# Patient Record
Sex: FEMALE | Race: BLACK OR AFRICAN AMERICAN | NOT HISPANIC OR LATINO | Employment: FULL TIME | URBAN - METROPOLITAN AREA
[De-identification: names, ages, dates, MRNs, and addresses within clinical notes are randomized per-mention and may not be internally consistent; named-entity substitution may affect disease eponyms.]

---

## 2018-04-06 ENCOUNTER — OFFICE VISIT (OUTPATIENT)
Dept: URGENT CARE | Facility: CLINIC | Age: 23
End: 2018-04-06
Payer: COMMERCIAL

## 2018-04-06 VITALS
SYSTOLIC BLOOD PRESSURE: 130 MMHG | TEMPERATURE: 99 F | WEIGHT: 293 LBS | RESPIRATION RATE: 12 BRPM | OXYGEN SATURATION: 99 % | HEIGHT: 66 IN | BODY MASS INDEX: 47.09 KG/M2 | DIASTOLIC BLOOD PRESSURE: 84 MMHG | HEART RATE: 88 BPM

## 2018-04-06 DIAGNOSIS — R30.0 DYSURIA: Primary | ICD-10-CM

## 2018-04-06 DIAGNOSIS — N30.01 ACUTE CYSTITIS WITH HEMATURIA: ICD-10-CM

## 2018-04-06 LAB
B-HCG UR QL: NEGATIVE
BILIRUB UR QL STRIP: NEGATIVE
CTP QC/QA: YES
GLUCOSE UR QL STRIP: NEGATIVE
KETONES UR QL STRIP: NEGATIVE
LEUKOCYTE ESTERASE UR QL STRIP: POSITIVE
PH, POC UA: 6 (ref 5–8)
POC BLOOD, URINE: POSITIVE
POC NITRATES, URINE: NEGATIVE
PROT UR QL STRIP: POSITIVE
SP GR UR STRIP: 1.01 (ref 1–1.03)
UROBILINOGEN UR STRIP-ACNC: NORMAL (ref 0.1–1.1)

## 2018-04-06 PROCEDURE — 81025 URINE PREGNANCY TEST: CPT | Mod: S$GLB,,, | Performed by: NURSE PRACTITIONER

## 2018-04-06 PROCEDURE — 81003 URINALYSIS AUTO W/O SCOPE: CPT | Mod: QW,S$GLB,, | Performed by: NURSE PRACTITIONER

## 2018-04-06 PROCEDURE — 99203 OFFICE O/P NEW LOW 30 MIN: CPT | Mod: 25,S$GLB,, | Performed by: NURSE PRACTITIONER

## 2018-04-06 RX ORDER — NITROFURANTOIN 25; 75 MG/1; MG/1
100 CAPSULE ORAL 2 TIMES DAILY
Qty: 10 CAPSULE | Refills: 0 | Status: SHIPPED | OUTPATIENT
Start: 2018-04-06 | End: 2018-04-11

## 2018-04-06 RX ORDER — PHENAZOPYRIDINE HYDROCHLORIDE 200 MG/1
200 TABLET, FILM COATED ORAL 3 TIMES DAILY PRN
Qty: 20 TABLET | Refills: 0 | Status: SHIPPED | OUTPATIENT
Start: 2018-04-06 | End: 2019-04-06

## 2018-04-06 NOTE — PROGRESS NOTES
"Subjective:       Patient ID: Saul Huff is a 22 y.o. female.    Vitals:  height is 5' 5.5" (1.664 m) and weight is 135.2 kg (298 lb). Her tympanic temperature is 98.8 °F (37.1 °C). Her blood pressure is 130/84 and her pulse is 88. Her respiration is 12 and oxygen saturation is 99%.     Chief Complaint: Urinary Tract Infection    Pt states she has pain on urination, frequency, urgency.      Urinary Tract Infection    This is a new problem. The current episode started today. The problem occurs intermittently. The problem has been gradually worsening. The quality of the pain is described as burning and aching. The pain is at a severity of 7/10. The pain is moderate. There has been no fever. She is sexually active. There is no history of pyelonephritis. Associated symptoms include flank pain, frequency and urgency. Pertinent negatives include no behavior changes, chills, discharge, hematuria, hesitancy, nausea, possible pregnancy, sweats, vomiting, weight loss, bubble bath use, constipation, rash or withholding. Treatments tried: AZO. The treatment provided no relief. Her past medical history is significant for recurrent UTIs.     Review of Systems   Constitution: Negative for chills, fever and weight loss.   Skin: Negative for itching and rash.   Musculoskeletal: Negative for back pain.   Gastrointestinal: Positive for abdominal pain. Negative for constipation, nausea and vomiting.   Genitourinary: Positive for dysuria, flank pain, frequency and urgency. Negative for genital sores, hematuria, hesitancy, missed menses and non-menstrual bleeding.   All other systems reviewed and are negative.      Objective:      Physical Exam   Constitutional: She is oriented to person, place, and time. She appears well-developed and well-nourished.   HENT:   Head: Normocephalic and atraumatic.   Right Ear: External ear normal.   Left Ear: External ear normal.   Nose: Nose normal. No nasal deformity. No epistaxis.   Mouth/Throat: " Oropharynx is clear and moist and mucous membranes are normal.   Eyes: Conjunctivae and lids are normal.   Neck: Trachea normal, normal range of motion and phonation normal. Neck supple.   Cardiovascular: Normal rate, regular rhythm, normal heart sounds and normal pulses.    Pulmonary/Chest: Effort normal and breath sounds normal.   Abdominal: Soft. Normal appearance and bowel sounds are normal. She exhibits no distension and no mass. There is no tenderness. There is no CVA tenderness.   Neurological: She is alert and oriented to person, place, and time.   Skin: Skin is warm, dry and intact.   Psychiatric: She has a normal mood and affect. Her speech is normal and behavior is normal. Cognition and memory are normal.   Nursing note and vitals reviewed.      Assessment:       1. Dysuria    2. Acute cystitis with hematuria        Plan:         Dysuria  -     POCT Urinalysis, Dipstick, Automated, W/O Scope  -     POCT urine pregnancy    Acute cystitis with hematuria    Other orders  -     nitrofurantoin, macrocrystal-monohydrate, (MACROBID) 100 MG capsule; Take 1 capsule (100 mg total) by mouth 2 (two) times daily.  Dispense: 10 capsule; Refill: 0  -     phenazopyridine (PYRIDIUM) 200 MG tablet; Take 1 tablet (200 mg total) by mouth 3 (three) times daily as needed for Pain.  Dispense: 20 tablet; Refill: 0        Dysuria     Painful urination (dysuria) is often caused by a problem in the urinary tract.   Dysuria is pain felt during urination. It is often described as a burning. Learn more about this problem and how it can be treated.  What causes dysuria?  Possible causes include:  · Infection with a bacteria or virus such as a urinary tract infection (UTI or a sexually transmitted infection (STI)  · Sensitivity or allergy to chemicals such as those found in lotions and other products  · Prostate or bladder problems  · Radiation therapy to the pelvic area  How is dysuria diagnosed?  Your healthcare provider will examine  "you. He or she will ask about your symptoms and health. After talking with you and doing a physical exam, your healthcare provider may know what is causing your dysuria. He or she will usually request  a sample of your urine. Tests of your urine, or a "urinalysis," are done. A urinalysis may include:  · Looking at the urine sample (visual exam)  · Checking for substances (chemical exam)  · Looking at a small amount under a microscope (microscopic exam)  Some parts of the urinalysis may be done in the provider's office and some in a lab. And, the urine sample may be checked for bacteria and yeast (urine culture). Your healthcare provider will tell you more about these tests if they are needed.  How is dysuria treated?  Treatment depends on the cause. If you have a bacterial infection, you may need antibiotics. You may be given medicines to make it easier for you to urinate and help relieve pain. Your healthcare provider can tell you more about your treatment options. Untreated, symptoms may get worse.  When to call your healthcare provider  Call the healthcare provider right away if you have any of the following:  · Fever of 100.4°F (38°C) or higher   · No improvement after three days of treatment  · Trouble urinating because of pain  · New or increased discharge from the vagina or penis  · Rash or joint pain  · Increased back or abdominal pain  · Enlarged painful lymph nodes (lumps) in the groin   Date Last Reviewed: 1/1/2017  © 4072-6180 [employer]. [address] [place] [address]. All rights reserved. This information is not intended as a substitute for professional medical care. Always follow your healthcare professional's instructions.    Since you have a long history of OB visits we will require you to return if these symptoms do not resolve over the course of the the next 3-5 days.  Please drink plenty of fluids.  Please get plenty of rest.  Please return here or go to the Emergency " Department for any concerns or worsening of condition.  If you were prescribed antibiotics, please take them to completion.  If you were given wait & see antibiotics, please wait 5-7 days before taking them, and only take them if your symptoms have worsened or not improved.  If you do begin taking the antibiotics, please take them to completion.  If not allergic, please take over the counter Tylenol (Acetaminophen) and/or Motrin (Ibuprofen) as directed for control of pain and/or fever.  Please follow up with your primary care doctor or specialist as needed.    If you  smoke, please stop smoking.

## 2018-04-06 NOTE — LETTER
April 6, 2018      [facility] [address], [address]  [place] [address]  Phone: [phone]  Fax: [phone]       Patient: Saul Huff   YOB: 1995  Date of Visit: 04/06/2018    To Whom It May Concern:    Shin Huff  was at [facility] on 04/06/2018. She may return to work/school on 04/08/18 with no restrictions. If you have any questions or concerns, or if I can be of further assistance, please do not hesitate to contact me.    Sincerely,    James Doe NP

## 2018-04-06 NOTE — PATIENT INSTRUCTIONS
"  Dysuria     Painful urination (dysuria) is often caused by a problem in the urinary tract.   Dysuria is pain felt during urination. It is often described as a burning. Learn more about this problem and how it can be treated.  What causes dysuria?  Possible causes include:  · Infection with a bacteria or virus such as a urinary tract infection (UTI or a sexually transmitted infection (STI)  · Sensitivity or allergy to chemicals such as those found in lotions and other products  · Prostate or bladder problems  · Radiation therapy to the pelvic area  How is dysuria diagnosed?  Your healthcare provider will examine you. He or she will ask about your symptoms and health. After talking with you and doing a physical exam, your healthcare provider may know what is causing your dysuria. He or she will usually request  a sample of your urine. Tests of your urine, or a "urinalysis," are done. A urinalysis may include:  · Looking at the urine sample (visual exam)  · Checking for substances (chemical exam)  · Looking at a small amount under a microscope (microscopic exam)  Some parts of the urinalysis may be done in the provider's office and some in a lab. And, the urine sample may be checked for bacteria and yeast (urine culture). Your healthcare provider will tell you more about these tests if they are needed.  How is dysuria treated?  Treatment depends on the cause. If you have a bacterial infection, you may need antibiotics. You may be given medicines to make it easier for you to urinate and help relieve pain. Your healthcare provider can tell you more about your treatment options. Untreated, symptoms may get worse.  When to call your healthcare provider  Call the healthcare provider right away if you have any of the following:  · Fever of 100.4°F (38°C) or higher   · No improvement after three days of treatment  · Trouble urinating because of pain  · New or increased discharge from the vagina or penis  · Rash or joint " pain  · Increased back or abdominal pain  · Enlarged painful lymph nodes (lumps) in the groin   Date Last Reviewed: 1/1/2017  © 1611-5787 [employer]. [address] [place] [address], [place] [address]. All rights reserved. This information is not intended as a substitute for professional medical care. Always follow your healthcare professional's instructions.    Since you have a long history of OB visits we will require you to return if these symptoms do not resolve over the course of the the next 3-5 days.  Please drink plenty of fluids.  Please get plenty of rest.  Please return here or go to the Emergency Department for any concerns or worsening of condition.  If you were prescribed antibiotics, please take them to completion.  If you were given wait & see antibiotics, please wait 5-7 days before taking them, and only take them if your symptoms have worsened or not improved.  If you do begin taking the antibiotics, please take them to completion.  If not allergic, please take over the counter Tylenol (Acetaminophen) and/or Motrin (Ibuprofen) as directed for control of pain and/or fever.  Please follow up with your primary care doctor or specialist as needed.    If you  smoke, please stop smoking.

## 2020-11-25 ENCOUNTER — OFFICE VISIT (OUTPATIENT)
Dept: URGENT CARE | Facility: CLINIC | Age: 25
End: 2020-11-25
Payer: COMMERCIAL

## 2020-11-25 VITALS
OXYGEN SATURATION: 97 % | SYSTOLIC BLOOD PRESSURE: 143 MMHG | WEIGHT: 218 LBS | BODY MASS INDEX: 36.32 KG/M2 | HEIGHT: 65 IN | HEART RATE: 101 BPM | TEMPERATURE: 96 F | DIASTOLIC BLOOD PRESSURE: 86 MMHG

## 2020-11-25 DIAGNOSIS — J20.8 ACUTE BACTERIAL BRONCHITIS: Primary | ICD-10-CM

## 2020-11-25 DIAGNOSIS — R05.9 COUGH: ICD-10-CM

## 2020-11-25 DIAGNOSIS — B96.89 ACUTE BACTERIAL BRONCHITIS: Primary | ICD-10-CM

## 2020-11-25 LAB
CTP QC/QA: YES
SARS-COV-2 RDRP RESP QL NAA+PROBE: NEGATIVE

## 2020-11-25 PROCEDURE — 71046 X-RAY EXAM CHEST 2 VIEWS: CPT | Mod: S$GLB,,, | Performed by: RADIOLOGY

## 2020-11-25 PROCEDURE — 99214 OFFICE O/P EST MOD 30 MIN: CPT | Mod: S$GLB,,, | Performed by: PHYSICIAN ASSISTANT

## 2020-11-25 PROCEDURE — 71046 XR CHEST PA AND LATERAL: ICD-10-PCS | Mod: S$GLB,,, | Performed by: RADIOLOGY

## 2020-11-25 PROCEDURE — U0002 COVID-19 LAB TEST NON-CDC: HCPCS | Mod: QW,S$GLB,, | Performed by: PHYSICIAN ASSISTANT

## 2020-11-25 PROCEDURE — 99214 PR OFFICE/OUTPT VISIT, EST, LEVL IV, 30-39 MIN: ICD-10-PCS | Mod: S$GLB,,, | Performed by: PHYSICIAN ASSISTANT

## 2020-11-25 PROCEDURE — 3008F BODY MASS INDEX DOCD: CPT | Mod: CPTII,S$GLB,, | Performed by: PHYSICIAN ASSISTANT

## 2020-11-25 PROCEDURE — U0002: ICD-10-PCS | Mod: QW,S$GLB,, | Performed by: PHYSICIAN ASSISTANT

## 2020-11-25 PROCEDURE — 3008F PR BODY MASS INDEX (BMI) DOCUMENTED: ICD-10-PCS | Mod: CPTII,S$GLB,, | Performed by: PHYSICIAN ASSISTANT

## 2020-11-25 RX ORDER — PROMETHAZINE HYDROCHLORIDE AND DEXTROMETHORPHAN HYDROBROMIDE 6.25; 15 MG/5ML; MG/5ML
5 SYRUP ORAL NIGHTLY PRN
Qty: 150 ML | Refills: 0 | Status: SHIPPED | OUTPATIENT
Start: 2020-11-25 | End: 2020-12-05

## 2020-11-25 RX ORDER — BENZONATATE 100 MG/1
100 CAPSULE ORAL 3 TIMES DAILY PRN
Qty: 30 CAPSULE | Refills: 0 | Status: SHIPPED | OUTPATIENT
Start: 2020-11-25 | End: 2020-12-05

## 2020-11-25 RX ORDER — ALBUTEROL SULFATE 90 UG/1
2 AEROSOL, METERED RESPIRATORY (INHALATION) EVERY 6 HOURS PRN
Qty: 18 G | Refills: 0 | Status: SHIPPED | OUTPATIENT
Start: 2020-11-25 | End: 2022-10-27

## 2020-11-25 RX ORDER — AZITHROMYCIN 250 MG/1
250 TABLET, FILM COATED ORAL DAILY
Qty: 6 TABLET | Refills: 0 | Status: SHIPPED | OUTPATIENT
Start: 2020-11-25 | End: 2020-12-01

## 2020-11-25 NOTE — PATIENT INSTRUCTIONS
General Instructions for URI:    Below are suggestions for symptomatic relief:              -Tylenol every 4 hours OR ibuprofen every 6 hours as needed for pain/fever.              -Salt water gargles to soothe throat pain.              -Chloroseptic spray also helps to numb throat pain.              -Nasal saline spray reduces inflammation and dryness.              -Warm face compresses to help with facial sinus pain/pressure.              -Vicks vapor rub at night.              -Flonase OTC or Nasacort OTC for nasal congestion.              -Simple foods like chicken noodle soup.              -Delsym helps with coughing at night              -Zyrtec/Claritin during the day & Benadryl at night may help with allergies.                If you DO NOT have Hypertension or any history of palpitations, it is ok to take over the counter Sudafed or Mucinex D or Allegra-D or Claritin-D or Zyrtec-D.  If you do take one of the above, it is ok to combine that with plain over the counter Mucinex or Allegra or Claritin or Zyrtec. If, for example, you are taking Zyrtec -D, you can combine that with Mucinex, but not Mucinex-D.  If you are taking Mucinex-D, you can combine that with plain Allegra or Claritin or Zyrtec.   If you DO have Hypertension or palpitations, it is safe to take Coricidin HBP for relief of sinus symptoms.     Please follow up with your primary care provider within 2-5 days if your signs and symptoms have not resolved or worsen.      If your condition worsens or fails to improve we recommend that you receive another evaluation at the emergency room immediately or contact your primary medical clinic to discuss your concerns.   You must understand that you have received an Urgent Care treatment only and that you may be released before all of your medical problems are known or treated. You, the patient, will arrange for follow up care as instructed.       Bronchitis, Antibiotic Treatment (Adult)    Bronchitis is an  infection of the air passages (bronchial tubes) in your lungs. It often occurs when you have a cold. This illness is contagious during the first few days and is spread through the air by coughing and sneezing, or by direct contact (touching the sick person and then touching your own eyes, nose, or mouth).  Symptoms of bronchitis include cough with mucus (phlegm) and low-grade fever. Bronchitis usually lasts 7 to 14 days. Mild cases can be treated with simple home remedies. More severe infection is treated with an antibiotic.  Home care  Follow these guidelines when caring for yourself at home:  · If your symptoms are severe, rest at home for the first 2 to 3 days. When you go back to your usual activities, don't let yourself get too tired.  · Do not smoke. Also avoid being exposed to secondhand smoke.  · You may use over-the-counter medicines to control fever or pain, unless another medicine was prescribed. (Note: If you have chronic liver or kidney disease or have ever had a stomach ulcer or gastrointestinal bleeding, talk with your healthcare provider before using these medicines. Also talk to your provider if you are taking medicine to prevent blood clots.) Aspirin should never be given to anyone younger than 18 years of age who is ill with a viral infection or fever. It may cause severe liver or brain damage.  · Your appetite may be poor, so a light diet is fine. Avoid dehydration by drinking 6 to 8 glasses of fluids per day (such as water, soft drinks, sports drinks, juices, tea, or soup). Extra fluids will help loosen secretions in the nose and lungs.  · Over-the-counter cough, cold, and sore-throat medicines will not shorten the length of the illness, but they may be helpful to reduce symptoms. (Note: Do not use decongestants if you have high blood pressure.)  · Finish all antibiotic medicine. Do this even if you are feeling better after only a few days.  Follow-up care  Follow up with your healthcare  provider, or as advised. If you had an X-ray or ECG (electrocardiogram), a specialist will review it. You will be notified of any new findings that may affect your care.  Note: If you are age 65 or older, or if you have a chronic lung disease or condition that affects your immune system, or you smoke, talk to your healthcare provider about having pneumococcal vaccinations and a yearly influenza vaccination (flu shot).  When to seek medical advice  Call your healthcare provider right away if any of these occur:  · Fever of 100.4°F (38°C) or higher  · Coughing up increased amounts of colored sputum  · Weakness, drowsiness, headache, facial pain, ear pain, or a stiff neck  Call 911, or get immediate medical care  Contact emergency services right away if any of these occur.  · Coughing up blood  · Worsening weakness, drowsiness, headache, or stiff neck  · Trouble breathing, wheezing, or pain with breathing  Date Last Reviewed: 9/13/2015  © 4654-4007 [employer]. [address] [place] [address], [place] [address]. All rights reserved. This information is not intended as a substitute for professional medical care. Always follow your healthcare professional's instructions.

## 2020-11-25 NOTE — PROGRESS NOTES
"Subjective:       Patient ID: Saul Huff is a 24 y.o. female.    Vitals:  height is 5' 5" (1.651 m) and weight is 98.9 kg (218 lb). Her temperature is 96.4 °F (35.8 °C). Her blood pressure is 143/86 (abnormal) and her pulse is 101. Her oxygen saturation is 97%.     Chief Complaint: Cough    Patient presenting with sinus congestion, sore throat, and productive cough that started 3 days ago. Reports that it feels as if its stuck in her chest. she's been coughing so much that she's felt SOB. Reports that she was tested for COPD a couple years ago by her PCP and given an albuterol inhaler at that time; last used this morning. She's vomited twice; once at work and another time after eating. She's been taking Dayquil cold+sinus, Benadryl, and nasal sprays without relief. Denies any fever or known recent COVID+ exposures.    Sinus Problem  This is a new problem. The current episode started in the past 7 days. The problem is unchanged. There has been no fever. The pain is mild. Associated symptoms include congestion, coughing, a hoarse voice, shortness of breath, sinus pressure and a sore throat. Pertinent negatives include no chills, diaphoresis or headaches. Treatments tried: Benadryl, dayquil.       Constitution: Negative for chills, sweating, fatigue and fever.   HENT: Positive for congestion, postnasal drip, sinus pain, sinus pressure and sore throat.    Neck: Negative for painful lymph nodes.   Cardiovascular: Negative for chest pain and leg swelling.   Eyes: Negative for double vision and blurred vision.   Respiratory: Positive for cough, sputum production and shortness of breath.    Gastrointestinal: Positive for vomiting. Negative for nausea and diarrhea.   Genitourinary: Negative for dysuria, frequency, urgency and history of kidney stones.   Musculoskeletal: Positive for muscle ache (h/o fibromyalgia). Negative for joint pain, joint swelling and muscle cramps.   Skin: Negative for color change, pale, rash " and bruising.   Allergic/Immunologic: Negative for seasonal allergies.   Neurological: Negative for dizziness, history of vertigo, light-headedness, passing out and headaches.   Hematologic/Lymphatic: Negative for swollen lymph nodes.   Psychiatric/Behavioral: Negative for nervous/anxious, sleep disturbance and depression. The patient is not nervous/anxious.        Objective:      Physical Exam   Constitutional: She is oriented to person, place, and time. She appears well-developed. She is cooperative.  Non-toxic appearance. She does not appear ill. No distress.   HENT:   Head: Normocephalic and atraumatic.   Ears:   Right Ear: Hearing, external ear and ear canal normal. A middle ear effusion is present.   Left Ear: Hearing, external ear and ear canal normal. A middle ear effusion is present.   Nose: Mucosal edema and rhinorrhea present. No nasal deformity. No epistaxis. Right sinus exhibits maxillary sinus tenderness and frontal sinus tenderness. Left sinus exhibits maxillary sinus tenderness and frontal sinus tenderness.   Mouth/Throat: Uvula is midline and mucous membranes are normal. No trismus in the jaw. Normal dentition. No uvula swelling. Posterior oropharyngeal erythema and cobblestoning present. No oropharyngeal exudate or posterior oropharyngeal edema.   Eyes: Conjunctivae and lids are normal. No scleral icterus.   Neck: Trachea normal, full passive range of motion without pain and phonation normal. Neck supple. No neck rigidity. No edema and no erythema present.   Cardiovascular: Normal rate, regular rhythm, normal heart sounds and normal pulses.   Pulmonary/Chest: Effort normal and breath sounds normal. No respiratory distress. She has no decreased breath sounds. She has no wheezes. She has no rhonchi.   Persistent hacking cough exacerbated with deep inspiration on assessment.    Comments: Persistent hacking cough exacerbated with deep inspiration on assessment.    Abdominal: Normal appearance.    Musculoskeletal: Normal range of motion.         General: No deformity.   Neurological: She is alert and oriented to person, place, and time. She exhibits normal muscle tone. Coordination normal.   Skin: Skin is warm, dry, intact, not diaphoretic and not pale. Psychiatric: Her speech is normal and behavior is normal. Judgment and thought content normal.   Nursing note and vitals reviewed.        Recent Results (from the past 48 hour(s))   POCT COVID-19 Rapid Screening    Collection Time: 11/25/20 10:03 AM   Result Value Ref Range    POC Rapid COVID Negative Negative     Acceptable Yes      Xr Chest Pa And Lateral    Result Date: 11/25/2020  EXAMINATION: XR CHEST PA AND LATERAL CLINICAL HISTORY: Cough TECHNIQUE: PA and lateral views of the chest were performed. COMPARISON: None FINDINGS: The cardiac silhouette and superior mediastinal structures are unremarkable. Pulmonary vasculature is within normal limits. The lungs are well aerated and free of focal consolidations. There is no evidence for pneumothorax or pleural effusions. Bony structures are grossly intact.  There is dextro convexity of the thoracic spine.     No acute chest disease identified. Electronically signed by: Ricardo Elam MD Date:    11/25/2020 Time:    10:27    Assessment:       1. Acute bacterial bronchitis    2. Cough        Plan:         Acute bacterial bronchitis  -     benzonatate (TESSALON) 100 MG capsule; Take 1 capsule (100 mg total) by mouth 3 (three) times daily as needed.  Dispense: 30 capsule; Refill: 0  -     promethazine-dextromethorphan (PROMETHAZINE-DM) 6.25-15 mg/5 mL Syrp; Take 5 mLs by mouth nightly as needed (cough).  Dispense: 150 mL; Refill: 0  -     azithromycin (Z-BENJA) 250 MG tablet; Take 1 tablet (250 mg total) by mouth once daily. Take 2 pills the first day then 1 pill a day for 4 days for 6 doses  Dispense: 6 tablet; Refill: 0  -     albuterol (PROVENTIL/VENTOLIN HFA) 90 mcg/actuation inhaler; Inhale 2  puffs into the lungs every 6 (six) hours as needed. Rescue  Dispense: 18 g; Refill: 0    Cough  -     POCT COVID-19 Rapid Screening  -     XR CHEST PA AND LATERAL; Future; Expected date: 11/25/2020          Patient Instructions   General Instructions for URI:    Below are suggestions for symptomatic relief:              -Tylenol every 4 hours OR ibuprofen every 6 hours as needed for pain/fever.              -Salt water gargles to soothe throat pain.              -Chloroseptic spray also helps to numb throat pain.              -Nasal saline spray reduces inflammation and dryness.              -Warm face compresses to help with facial sinus pain/pressure.              -Vicks vapor rub at night.              -Flonase OTC or Nasacort OTC for nasal congestion.              -Simple foods like chicken noodle soup.              -Delsym helps with coughing at night              -Zyrtec/Claritin during the day & Benadryl at night may help with allergies.                If you DO NOT have Hypertension or any history of palpitations, it is ok to take over the counter Sudafed or Mucinex D or Allegra-D or Claritin-D or Zyrtec-D.  If you do take one of the above, it is ok to combine that with plain over the counter Mucinex or Allegra or Claritin or Zyrtec. If, for example, you are taking Zyrtec -D, you can combine that with Mucinex, but not Mucinex-D.  If you are taking Mucinex-D, you can combine that with plain Allegra or Claritin or Zyrtec.   If you DO have Hypertension or palpitations, it is safe to take Coricidin HBP for relief of sinus symptoms.     Please follow up with your primary care provider within 2-5 days if your signs and symptoms have not resolved or worsen.      If your condition worsens or fails to improve we recommend that you receive another evaluation at the emergency room immediately or contact your primary medical clinic to discuss your concerns.   You must understand that you have received an Urgent Care  treatment only and that you may be released before all of your medical problems are known or treated. You, the patient, will arrange for follow up care as instructed.       Bronchitis, Antibiotic Treatment (Adult)    Bronchitis is an infection of the air passages (bronchial tubes) in your lungs. It often occurs when you have a cold. This illness is contagious during the first few days and is spread through the air by coughing and sneezing, or by direct contact (touching the sick person and then touching your own eyes, nose, or mouth).  Symptoms of bronchitis include cough with mucus (phlegm) and low-grade fever. Bronchitis usually lasts 7 to 14 days. Mild cases can be treated with simple home remedies. More severe infection is treated with an antibiotic.  Home care  Follow these guidelines when caring for yourself at home:  · If your symptoms are severe, rest at home for the first 2 to 3 days. When you go back to your usual activities, don't let yourself get too tired.  · Do not smoke. Also avoid being exposed to secondhand smoke.  · You may use over-the-counter medicines to control fever or pain, unless another medicine was prescribed. (Note: If you have chronic liver or kidney disease or have ever had a stomach ulcer or gastrointestinal bleeding, talk with your healthcare provider before using these medicines. Also talk to your provider if you are taking medicine to prevent blood clots.) Aspirin should never be given to anyone younger than 18 years of age who is ill with a viral infection or fever. It may cause severe liver or brain damage.  · Your appetite may be poor, so a light diet is fine. Avoid dehydration by drinking 6 to 8 glasses of fluids per day (such as water, soft drinks, sports drinks, juices, tea, or soup). Extra fluids will help loosen secretions in the nose and lungs.  · Over-the-counter cough, cold, and sore-throat medicines will not shorten the length of the illness, but they may be helpful to  reduce symptoms. (Note: Do not use decongestants if you have high blood pressure.)  · Finish all antibiotic medicine. Do this even if you are feeling better after only a few days.  Follow-up care  Follow up with your healthcare provider, or as advised. If you had an X-ray or ECG (electrocardiogram), a specialist will review it. You will be notified of any new findings that may affect your care.  Note: If you are age 65 or older, or if you have a chronic lung disease or condition that affects your immune system, or you smoke, talk to your healthcare provider about having pneumococcal vaccinations and a yearly influenza vaccination (flu shot).  When to seek medical advice  Call your healthcare provider right away if any of these occur:  · Fever of 100.4°F (38°C) or higher  · Coughing up increased amounts of colored sputum  · Weakness, drowsiness, headache, facial pain, ear pain, or a stiff neck  Call 911, or get immediate medical care  Contact emergency services right away if any of these occur.  · Coughing up blood  · Worsening weakness, drowsiness, headache, or stiff neck  · Trouble breathing, wheezing, or pain with breathing  Date Last Reviewed: 9/13/2015  © 2773-2597 [employer] [address] [place] [address], [place] [address]. All rights reserved. This information is not intended as a substitute for professional medical care. Always follow your healthcare professional's instructions.

## 2022-02-15 ENCOUNTER — TELEPHONE (OUTPATIENT)
Dept: INTERNAL MEDICINE | Facility: CLINIC | Age: 27
End: 2022-02-15
Payer: COMMERCIAL

## 2022-02-15 NOTE — TELEPHONE ENCOUNTER
----- Message from Winnie Obrien sent at 2/15/2022  1:58 PM CST -----  Contact: [phone]  Pt has an appt today she states she is trying to see if she can come in a little earlier she had an emergency her appt is at 4 please advise and give a return call

## 2022-02-15 NOTE — TELEPHONE ENCOUNTER
Spoke with patient regarding message, patient reports that she recently found out that she was pregnant and has been having nausea, abdominal cramping and diarrhea for 2 days, patient also stated that earlier today she started spotting blood and now is having bleeding similar to her first day of her  menstrual cycle. Informed patient patient it may be best to follow up with ER/UC for evaluation, patient stated she would go to ER for evaluation

## 2022-03-08 ENCOUNTER — TELEPHONE (OUTPATIENT)
Dept: RHEUMATOLOGY | Facility: CLINIC | Age: 27
End: 2022-03-08
Payer: COMMERCIAL

## 2022-03-08 NOTE — TELEPHONE ENCOUNTER
Spoke with the patient and tried to give her an appointment with a fellow. She stated that she can only go to the [place] office. The patient did decline the first available and will reach out to the [place] office to schedule

## 2022-06-30 ENCOUNTER — OFFICE VISIT (OUTPATIENT)
Dept: FAMILY MEDICINE | Facility: CLINIC | Age: 27
End: 2022-06-30
Payer: COMMERCIAL

## 2022-06-30 VITALS
HEART RATE: 90 BPM | WEIGHT: 293 LBS | TEMPERATURE: 99 F | DIASTOLIC BLOOD PRESSURE: 76 MMHG | BODY MASS INDEX: 48.82 KG/M2 | SYSTOLIC BLOOD PRESSURE: 134 MMHG | OXYGEN SATURATION: 99 % | HEIGHT: 65 IN

## 2022-06-30 DIAGNOSIS — D50.9 IRON DEFICIENCY ANEMIA, UNSPECIFIED IRON DEFICIENCY ANEMIA TYPE: ICD-10-CM

## 2022-06-30 DIAGNOSIS — Z13.220 SCREENING CHOLESTEROL LEVEL: ICD-10-CM

## 2022-06-30 DIAGNOSIS — Z13.1 DIABETES MELLITUS SCREENING: ICD-10-CM

## 2022-06-30 DIAGNOSIS — Z97.3 USES CONTACT LENSES: ICD-10-CM

## 2022-06-30 DIAGNOSIS — R30.0 DYSURIA: Primary | ICD-10-CM

## 2022-06-30 DIAGNOSIS — L84 PLANTAR CALLUS: ICD-10-CM

## 2022-06-30 LAB
BILIRUB SERPL-MCNC: NEGATIVE MG/DL
BLOOD URINE, POC: NEGATIVE
CLARITY, POC UA: NORMAL
COLOR, POC UA: NORMAL
GLUCOSE UR QL STRIP: NORMAL
KETONES UR QL STRIP: NEGATIVE
LEUKOCYTE ESTERASE URINE, POC: NEGATIVE
NITRITE, POC UA: NEGATIVE
PH, POC UA: 5
PROTEIN, POC: NORMAL
SPECIFIC GRAVITY, POC UA: 1025
UROBILINOGEN, POC UA: NORMAL

## 2022-06-30 PROCEDURE — 99999 PR PBB SHADOW E&M-EST. PATIENT-LVL IV: CPT | Mod: PBBFAC,,, | Performed by: STUDENT IN AN ORGANIZED HEALTH CARE EDUCATION/TRAINING PROGRAM

## 2022-06-30 PROCEDURE — 99203 OFFICE O/P NEW LOW 30 MIN: CPT | Mod: S$GLB,,, | Performed by: STUDENT IN AN ORGANIZED HEALTH CARE EDUCATION/TRAINING PROGRAM

## 2022-06-30 PROCEDURE — 81002 URINALYSIS NONAUTO W/O SCOPE: CPT | Mod: S$GLB,,, | Performed by: STUDENT IN AN ORGANIZED HEALTH CARE EDUCATION/TRAINING PROGRAM

## 2022-06-30 PROCEDURE — 3078F PR MOST RECENT DIASTOLIC BLOOD PRESSURE < 80 MM HG: ICD-10-PCS | Mod: CPTII,S$GLB,, | Performed by: STUDENT IN AN ORGANIZED HEALTH CARE EDUCATION/TRAINING PROGRAM

## 2022-06-30 PROCEDURE — 3078F DIAST BP <80 MM HG: CPT | Mod: CPTII,S$GLB,, | Performed by: STUDENT IN AN ORGANIZED HEALTH CARE EDUCATION/TRAINING PROGRAM

## 2022-06-30 PROCEDURE — 3075F PR MOST RECENT SYSTOLIC BLOOD PRESS GE 130-139MM HG: ICD-10-PCS | Mod: CPTII,S$GLB,, | Performed by: STUDENT IN AN ORGANIZED HEALTH CARE EDUCATION/TRAINING PROGRAM

## 2022-06-30 PROCEDURE — 99999 PR PBB SHADOW E&M-EST. PATIENT-LVL IV: ICD-10-PCS | Mod: PBBFAC,,, | Performed by: STUDENT IN AN ORGANIZED HEALTH CARE EDUCATION/TRAINING PROGRAM

## 2022-06-30 PROCEDURE — 3044F HG A1C LEVEL LT 7.0%: CPT | Mod: CPTII,S$GLB,, | Performed by: STUDENT IN AN ORGANIZED HEALTH CARE EDUCATION/TRAINING PROGRAM

## 2022-06-30 PROCEDURE — 81002 POCT URINE DIPSTICK WITHOUT MICROSCOPE: ICD-10-PCS | Mod: S$GLB,,, | Performed by: STUDENT IN AN ORGANIZED HEALTH CARE EDUCATION/TRAINING PROGRAM

## 2022-06-30 PROCEDURE — 3075F SYST BP GE 130 - 139MM HG: CPT | Mod: CPTII,S$GLB,, | Performed by: STUDENT IN AN ORGANIZED HEALTH CARE EDUCATION/TRAINING PROGRAM

## 2022-06-30 PROCEDURE — 99203 PR OFFICE/OUTPT VISIT, NEW, LEVL III, 30-44 MIN: ICD-10-PCS | Mod: S$GLB,,, | Performed by: STUDENT IN AN ORGANIZED HEALTH CARE EDUCATION/TRAINING PROGRAM

## 2022-06-30 PROCEDURE — 3008F BODY MASS INDEX DOCD: CPT | Mod: CPTII,S$GLB,, | Performed by: STUDENT IN AN ORGANIZED HEALTH CARE EDUCATION/TRAINING PROGRAM

## 2022-06-30 PROCEDURE — 3044F PR MOST RECENT HEMOGLOBIN A1C LEVEL <7.0%: ICD-10-PCS | Mod: CPTII,S$GLB,, | Performed by: STUDENT IN AN ORGANIZED HEALTH CARE EDUCATION/TRAINING PROGRAM

## 2022-06-30 PROCEDURE — 3008F PR BODY MASS INDEX (BMI) DOCUMENTED: ICD-10-PCS | Mod: CPTII,S$GLB,, | Performed by: STUDENT IN AN ORGANIZED HEALTH CARE EDUCATION/TRAINING PROGRAM

## 2022-06-30 RX ORDER — ONDANSETRON 4 MG/1
4 TABLET, FILM COATED ORAL EVERY 8 HOURS PRN
COMMUNITY
Start: 2022-01-28 | End: 2022-06-30

## 2022-06-30 RX ORDER — MELOXICAM 15 MG/1
15 TABLET ORAL
COMMUNITY
Start: 2022-06-10 | End: 2022-06-30

## 2022-06-30 RX ORDER — GABAPENTIN 300 MG/1
300 CAPSULE ORAL 3 TIMES DAILY
COMMUNITY
Start: 2022-01-27 | End: 2022-06-30

## 2022-06-30 RX ORDER — GABAPENTIN 300 MG/1
1 CAPSULE ORAL 3 TIMES DAILY
COMMUNITY
Start: 2021-10-19 | End: 2022-06-30

## 2022-06-30 RX ORDER — POLYETHYLENE GLYCOL 3350
POWDER (GRAM) MISCELLANEOUS
COMMUNITY
End: 2022-06-30 | Stop reason: SDUPTHER

## 2022-06-30 RX ORDER — MELOXICAM 15 MG/1
15 TABLET ORAL DAILY PRN
COMMUNITY
Start: 2022-06-10 | End: 2022-06-30

## 2022-06-30 RX ORDER — LAMOTRIGINE 25 MG/1
25 TABLET ORAL
COMMUNITY
Start: 2022-04-21 | End: 2022-06-30 | Stop reason: SDUPTHER

## 2022-06-30 RX ORDER — IBUPROFEN 600 MG/1
600 TABLET ORAL EVERY 6 HOURS PRN
COMMUNITY
Start: 2022-02-20 | End: 2022-06-30

## 2022-06-30 RX ORDER — CYCLOBENZAPRINE HCL 10 MG
10 TABLET ORAL 3 TIMES DAILY PRN
COMMUNITY
Start: 2022-06-20 | End: 2022-08-19 | Stop reason: SDUPTHER

## 2022-06-30 RX ORDER — LAMOTRIGINE 25 MG/1
150 TABLET ORAL DAILY
COMMUNITY
Start: 2022-06-21

## 2022-06-30 RX ORDER — LINACLOTIDE 145 UG/1
145 CAPSULE, GELATIN COATED ORAL DAILY
COMMUNITY
Start: 2022-05-23 | End: 2022-06-30 | Stop reason: SDUPTHER

## 2022-06-30 RX ORDER — PRENATAL VIT 27,CALC/IRON/FA 60 MG-1 MG
1 TABLET ORAL DAILY
COMMUNITY
Start: 2022-02-02 | End: 2022-06-30

## 2022-06-30 RX ORDER — TRAZODONE HYDROCHLORIDE 50 MG/1
50 TABLET ORAL
COMMUNITY
Start: 2021-10-11 | End: 2022-06-30

## 2022-06-30 RX ORDER — BUPROPION HYDROCHLORIDE 150 MG/1
150 TABLET ORAL
COMMUNITY
Start: 2021-10-11 | End: 2022-06-30

## 2022-06-30 RX ORDER — HYDROXYZINE PAMOATE 25 MG/1
CAPSULE ORAL
COMMUNITY
Start: 2022-04-21

## 2022-06-30 RX ORDER — POLYETHYLENE GLYCOL 3350 17 G/17G
POWDER, FOR SOLUTION ORAL
COMMUNITY
Start: 2022-05-23

## 2022-06-30 RX ORDER — NORETHINDRONE ACETATE AND ETHINYL ESTRADIOL 1.5-30(21)
1 KIT ORAL DAILY
COMMUNITY
End: 2022-06-30

## 2022-06-30 RX ORDER — NORETHINDRONE ACETATE AND ETHINYL ESTRADIOL AND FERROUS FUMARATE 1.5-30(21)
1 KIT ORAL DAILY
COMMUNITY
Start: 2022-04-19 | End: 2022-09-06 | Stop reason: SDUPTHER

## 2022-06-30 RX ORDER — ONDANSETRON 4 MG/1
4 TABLET, ORALLY DISINTEGRATING ORAL EVERY 6 HOURS PRN
COMMUNITY
Start: 2022-02-08 | End: 2022-06-30

## 2022-06-30 RX ORDER — VILAZODONE HYDROCHLORIDE 40 MG/1
40 TABLET ORAL
COMMUNITY
Start: 2021-10-11 | End: 2022-06-30

## 2022-06-30 RX ORDER — PREDNISONE 10 MG/1
TABLET ORAL
COMMUNITY
Start: 2022-02-23 | End: 2022-06-30

## 2022-06-30 RX ORDER — VILAZODONE HYDROCHLORIDE 40 MG/1
40 TABLET ORAL EVERY MORNING
COMMUNITY
Start: 2022-01-27 | End: 2022-06-30

## 2022-06-30 RX ORDER — NORETHINDRONE ACETATE AND ETHINYL ESTRADIOL 1.5-30(21)
1 KIT ORAL
COMMUNITY
Start: 2022-04-18 | End: 2022-06-30

## 2022-06-30 NOTE — PROGRESS NOTES
07/04/2022    Saul Huff  [id number]    CC: est care    HPI    This patient is new to me and presents to establish care. Chronic medical conditions listed below.    Constipation/diarrhea  Right now with constipation but when taking linzess or miralax gets diarrhea   Metamucil doesn't work too well    Bladder infection?  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 Shaver at [facility]  Hx of miscarriage in Feb    Hx of following podiatrist for flat feet  Now with something on the bottom of her big toes    States that it's too costly  Very picky eater  Hx of referral bariatrics but   Doesn't like water but does tolerate [employer] brand water    Fibromyalgia: will be seeing Rheum  Is supposed to see PT  Cyclobenzaprine and tylenol arthritis      POSITIVE BOLDED  General: fever, chills, fatigue, weight loss  Eyes: vision changes, blurry vision, eye pain  ENT: hearing loss, ringing, dental problems, sinus congestion, sore throat  Resp: SOB, cough, wheeze  CV: chest pain, palpitations, LE swelling  GI: diarrhea/constipation, black/bloody stools, indigestion  : frequency, urgency, dysuria, polydipsia, hematuria  Skin: rashes, wounds, lacerations  Psych: SI, HI, depressed  Endocrine: heat intolerance, cold intolerance    Past Medical History:   Diagnosis Date    Anxiety        History reviewed. No pertinent surgical history.    Family History   Problem Relation Age of Onset    Ovarian cancer Paternal Grandmother     Breast cancer Neg Hx     Colon cancer Neg Hx        Social History     Socioeconomic History    Marital status: Single   Tobacco Use    Smoking status: Never Smoker    Smokeless tobacco:  Never Used   Substance and Sexual Activity    Alcohol use: No    Drug use: No    Sexual activity: Not Currently         Current Outpatient Medications:     cetirizine (ZYRTEC) 10 MG tablet, Take 10 mg by mouth., Disp: , Rfl:     cyclobenzaprine (FLEXERIL) 10 MG tablet, Take 10 mg by mouth 3 (three) times daily as needed., Disp: , Rfl:     fluticasone propionate (FLONASE) 50 mcg/actuation nasal spray, 1 spray by Nasal route., Disp: , Rfl:     hydrOXYzine pamoate (VISTARIL) 25 MG Cap, TAKE 1 CAPSULE BY MOUTH EVERY DAY AS NEEDED FOR ANXIETY, Disp: , Rfl:     linaCLOtide (LINZESS) 145 mcg Cap capsule, Take 145 mcg by mouth., Disp: , Rfl:     albuterol (PROVENTIL/VENTOLIN HFA) 90 mcg/actuation inhaler, Inhale 2 puffs into the lungs every 6 (six) hours as needed. Rescue, Disp: 18 g, Rfl: 0    KORY FE 1.5/30, 28, 1.5 mg-30 mcg (21)/75 mg (7) tablet, Take 1 tablet by mouth once daily., Disp: , Rfl:     LAMICTAL 25 mg tablet, Take 25 mg by mouth 2 (two) times daily., Disp: , Rfl:     polyethylene glycol (GLYCOLAX) 17 gram/dose powder, Take by mouth., Disp: , Rfl:     urea 20 % Crea, Apply 1 application topically once daily. To dry skin on the feet., Disp: 75 g, Rfl: 10        Vitals:    06/30/22 1420   BP: 134/76   Pulse: 90   Temp: 98.7 °F (37.1 °C)       PHYSICAL EXAM    GEN: NAD, AAox3, well nourished  HEENT: NCAT, EOMI, PEERL, no scleral injection, TM normal, moist mucous membranes, oropharynx clear, no erythema, no exudates  NECK: full rom, no cervical lymphadenopathy, no thyroidmegally  CV: RRR, no m/r/g, trace LE edema  LUNGS: CTAB, non-labored breathing, no wheezes, no crackles  ABD: soft, non-distended, no rebound/guarding, no organomegaly  EXT: n c/c/e, warm, 5/5 UE and LE strength  NEURO: CNII-XII intact no focal deficit  PSYCH: nl affect, no hallucinations, nl speech  Skin: inatct, no rashes/lesions/erythema    1. Dysuria  - POCT URINE DIPSTICK WITHOUT MICROSCOPE: without signs of infection    2.  Plantar callus  - Ambulatory referral/consult to Podiatry; Future    3. Uses contact lenses  - Ambulatory referral/consult to Optometry; Future    4. Screening cholesterol level  - Lipid Panel; Future    5. Diabetes mellitus screening  - Hemoglobin A1C; Future    6. Iron deficiency anemia, unspecified iron deficiency anemia type  - CBC Auto Differential; Future  - Iron and TIBC; Future    RTC pending workup    Dulce Villalobos MD  Family Medicine

## 2022-07-01 ENCOUNTER — OFFICE VISIT (OUTPATIENT)
Dept: PODIATRY | Facility: CLINIC | Age: 27
End: 2022-07-01
Payer: COMMERCIAL

## 2022-07-01 ENCOUNTER — LAB VISIT (OUTPATIENT)
Dept: LAB | Facility: HOSPITAL | Age: 27
End: 2022-07-01
Attending: STUDENT IN AN ORGANIZED HEALTH CARE EDUCATION/TRAINING PROGRAM
Payer: COMMERCIAL

## 2022-07-01 VITALS — BODY MASS INDEX: 48.82 KG/M2 | HEIGHT: 65 IN | WEIGHT: 293 LBS

## 2022-07-01 DIAGNOSIS — Z13.1 DIABETES MELLITUS SCREENING: ICD-10-CM

## 2022-07-01 DIAGNOSIS — D50.9 IRON DEFICIENCY ANEMIA, UNSPECIFIED IRON DEFICIENCY ANEMIA TYPE: ICD-10-CM

## 2022-07-01 DIAGNOSIS — L85.1 PLANTAR POROKERATOSIS, ACQUIRED: Primary | ICD-10-CM

## 2022-07-01 DIAGNOSIS — L84 PLANTAR CALLUS: ICD-10-CM

## 2022-07-01 DIAGNOSIS — Z13.220 SCREENING CHOLESTEROL LEVEL: ICD-10-CM

## 2022-07-01 LAB
BASOPHILS # BLD AUTO: 0.06 K/UL (ref 0–0.2)
BASOPHILS NFR BLD: 0.4 % (ref 0–1.9)
CHOLEST SERPL-MCNC: 186 MG/DL (ref 120–199)
CHOLEST/HDLC SERPL: 5 {RATIO} (ref 2–5)
DIFFERENTIAL METHOD: ABNORMAL
EOSINOPHIL # BLD AUTO: 0.1 K/UL (ref 0–0.5)
EOSINOPHIL NFR BLD: 0.7 % (ref 0–8)
ERYTHROCYTE [DISTWIDTH] IN BLOOD BY AUTOMATED COUNT: 12 % (ref 11.5–14.5)
ESTIMATED AVG GLUCOSE: 88 MG/DL (ref 68–131)
HBA1C MFR BLD: 4.7 % (ref 4–5.6)
HCT VFR BLD AUTO: 37.9 % (ref 37–48.5)
HDLC SERPL-MCNC: 37 MG/DL (ref 40–75)
HDLC SERPL: 19.9 % (ref 20–50)
HGB BLD-MCNC: 12.4 G/DL (ref 12–16)
IMM GRANULOCYTES # BLD AUTO: 0.03 K/UL (ref 0–0.04)
IMM GRANULOCYTES NFR BLD AUTO: 0.2 % (ref 0–0.5)
IRON SERPL-MCNC: 69 UG/DL (ref 30–160)
LDLC SERPL CALC-MCNC: 129.8 MG/DL (ref 63–159)
LYMPHOCYTES # BLD AUTO: 5.9 K/UL (ref 1–4.8)
LYMPHOCYTES NFR BLD: 37.7 % (ref 18–48)
MCH RBC QN AUTO: 32.5 PG (ref 27–31)
MCHC RBC AUTO-ENTMCNC: 32.7 G/DL (ref 32–36)
MCV RBC AUTO: 100 FL (ref 82–98)
MONOCYTES # BLD AUTO: 0.5 K/UL (ref 0.3–1)
MONOCYTES NFR BLD: 3.3 % (ref 4–15)
NEUTROPHILS # BLD AUTO: 9 K/UL (ref 1.8–7.7)
NEUTROPHILS NFR BLD: 57.7 % (ref 38–73)
NONHDLC SERPL-MCNC: 149 MG/DL
NRBC BLD-RTO: 0 /100 WBC
PLATELET # BLD AUTO: 365 K/UL (ref 150–450)
PMV BLD AUTO: 10.2 FL (ref 9.2–12.9)
RBC # BLD AUTO: 3.81 M/UL (ref 4–5.4)
SATURATED IRON: 18 % (ref 20–50)
TOTAL IRON BINDING CAPACITY: 374 UG/DL (ref 250–450)
TRANSFERRIN SERPL-MCNC: 253 MG/DL (ref 200–375)
TRIGL SERPL-MCNC: 96 MG/DL (ref 30–150)
WBC # BLD AUTO: 15.65 K/UL (ref 3.9–12.7)

## 2022-07-01 PROCEDURE — 1159F MED LIST DOCD IN RCRD: CPT | Mod: CPTII,S$GLB,, | Performed by: PODIATRIST

## 2022-07-01 PROCEDURE — 3008F PR BODY MASS INDEX (BMI) DOCUMENTED: ICD-10-PCS | Mod: CPTII,S$GLB,, | Performed by: PODIATRIST

## 2022-07-01 PROCEDURE — 99999 PR PBB SHADOW E&M-EST. PATIENT-LVL III: ICD-10-PCS | Mod: PBBFAC,,, | Performed by: PODIATRIST

## 2022-07-01 PROCEDURE — 1160F RVW MEDS BY RX/DR IN RCRD: CPT | Mod: CPTII,S$GLB,, | Performed by: PODIATRIST

## 2022-07-01 PROCEDURE — 83036 HEMOGLOBIN GLYCOSYLATED A1C: CPT | Performed by: STUDENT IN AN ORGANIZED HEALTH CARE EDUCATION/TRAINING PROGRAM

## 2022-07-01 PROCEDURE — 3008F BODY MASS INDEX DOCD: CPT | Mod: CPTII,S$GLB,, | Performed by: PODIATRIST

## 2022-07-01 PROCEDURE — 85025 COMPLETE CBC W/AUTO DIFF WBC: CPT | Performed by: STUDENT IN AN ORGANIZED HEALTH CARE EDUCATION/TRAINING PROGRAM

## 2022-07-01 PROCEDURE — 1159F PR MEDICATION LIST DOCUMENTED IN MEDICAL RECORD: ICD-10-PCS | Mod: CPTII,S$GLB,, | Performed by: PODIATRIST

## 2022-07-01 PROCEDURE — 99999 PR PBB SHADOW E&M-EST. PATIENT-LVL III: CPT | Mod: PBBFAC,,, | Performed by: PODIATRIST

## 2022-07-01 PROCEDURE — 99203 OFFICE O/P NEW LOW 30 MIN: CPT | Mod: S$GLB,,, | Performed by: PODIATRIST

## 2022-07-01 PROCEDURE — 99203 PR OFFICE/OUTPT VISIT, NEW, LEVL III, 30-44 MIN: ICD-10-PCS | Mod: S$GLB,,, | Performed by: PODIATRIST

## 2022-07-01 PROCEDURE — 1160F PR REVIEW ALL MEDS BY PRESCRIBER/CLIN PHARMACIST DOCUMENTED: ICD-10-PCS | Mod: CPTII,S$GLB,, | Performed by: PODIATRIST

## 2022-07-01 PROCEDURE — 84466 ASSAY OF TRANSFERRIN: CPT | Performed by: STUDENT IN AN ORGANIZED HEALTH CARE EDUCATION/TRAINING PROGRAM

## 2022-07-01 PROCEDURE — 80061 LIPID PANEL: CPT | Performed by: STUDENT IN AN ORGANIZED HEALTH CARE EDUCATION/TRAINING PROGRAM

## 2022-07-01 PROCEDURE — 36415 COLL VENOUS BLD VENIPUNCTURE: CPT | Mod: PO | Performed by: STUDENT IN AN ORGANIZED HEALTH CARE EDUCATION/TRAINING PROGRAM

## 2022-07-01 RX ORDER — UREA 200 MG/G
1 CREAM TOPICAL DAILY
Qty: 75 G | Refills: 10 | Status: SHIPPED | OUTPATIENT
Start: 2022-07-01 | End: 2023-12-04

## 2022-07-01 NOTE — PROGRESS NOTES
Chief Complaint   Patient presents with    Foot Problem     Bilateral wart/callouses under big toes             HPI:   Saul Huff is a 26 y.o. female who presents to clinic with concerns of painful corns at the base of bilateral great toes, present for the past months.    Pain is worse with walking and direct pressure.    Patient recalls no acute trauma to the area.     Treatment tried:  Salon pedicures but painful when they try to debride the lesions.        PCP:  Primary Doctor No       Patient Active Problem List   Diagnosis    ALLERGIC RHINITIS    Hydradenitis             Current Outpatient Medications on File Prior to Visit   Medication Sig Dispense Refill    cetirizine (ZYRTEC) 10 MG tablet Take 10 mg by mouth.      cyclobenzaprine (FLEXERIL) 10 MG tablet Take 10 mg by mouth 3 (three) times daily as needed.      fluticasone propionate (FLONASE) 50 mcg/actuation nasal spray 1 spray by Nasal route.      KORY FE 1.5/30, 28, 1.5 mg-30 mcg (21)/75 mg (7) tablet Take 1 tablet by mouth once daily.      hydrOXYzine pamoate (VISTARIL) 25 MG Cap TAKE 1 CAPSULE BY MOUTH EVERY DAY AS NEEDED FOR ANXIETY      LAMICTAL 25 mg tablet Take 25 mg by mouth 2 (two) times daily.      linaCLOtide (LINZESS) 145 mcg Cap capsule Take 145 mcg by mouth.      polyethylene glycol (GLYCOLAX) 17 gram/dose powder Take by mouth.      albuterol (PROVENTIL/VENTOLIN HFA) 90 mcg/actuation inhaler Inhale 2 puffs into the lungs every 6 (six) hours as needed. Rescue 18 g 0     No current facility-administered medications on file prior to visit.         Review of patient's allergies indicates:  No Known Allergies        ROS:  General ROS: negative for - chills, fatigue or fever  Cardiovascular ROS: no chest pain or dyspnea on exertion  Musculoskeletal ROS: negative for - joint pain or joint stiffness   Skin: Negative for rash, negative for nail or hair changes. Positive for  Corn/callus on the foot.         EXAM:    Vitals:     "07/01/22 0947   Weight: (!) 149.9 kg (330 lb 7.5 oz)   Height: 5' 5" (1.651 m)        General: alert and orient and in no apparent distress.        bilateral foot:  Vasc:   Palpable pedal pulses  Feet appropriately warm to touch.   Capillary refill time within normal limits.   Edema: Absent      Neuro:   Epicritic sensation intact.   Tinels sign:  Absent  Mulders click: Absent  SWMF: Absent      MSK:     normal toes without deformities  Muscle strength:  5/5  Joints:  without crepitus  Deformity: none      Derm:      Nucleated, Hyperkeratosis located at the base of bilateral hallux, round, firm, fixed and tender, does not appear verrucous.   No other open lesions, macerations, or rashes noted.   Skin is otherwise Normal on the soles.   Erythema:  none at the affected location  Bruising:  absent          MEDICAL DECISION MAKING:         ICD-10-CM ICD-9-CM    1. Plantar porokeratosis, acquired  L85.1 701.1 urea 20 % Crea   2. Plantar callus  L84 700 Ambulatory referral/consult to Podiatry      urea 20 % Crea           · I counseled the patient on the patient's conditions, their implications and medical management.   Shoe and activity modification as needed for relief.    Cream as ordered.   Use daily.    Avoid barefoot walking to protect skin on the feet.    I trimmed the lesions as a courtesy today.    Call or return to clinic prn if these symptoms worsen or fail to improve as anticipated. Patient is amenable to plan.    "

## 2022-07-05 DIAGNOSIS — D72.829 LEUKOCYTOSIS, UNSPECIFIED TYPE: Primary | ICD-10-CM

## 2022-07-05 NOTE — PROGRESS NOTES
Please let patient know that her labs were ok. Her CBC does have a slightly elevated white blood cell count, which can be seen with any viral or bacterial infection. We can repeat this is a few weeks to see that it resolves.

## 2022-07-06 ENCOUNTER — TELEPHONE (OUTPATIENT)
Dept: FAMILY MEDICINE | Facility: CLINIC | Age: 27
End: 2022-07-06
Payer: COMMERCIAL

## 2022-07-14 ENCOUNTER — OFFICE VISIT (OUTPATIENT)
Dept: RHEUMATOLOGY | Facility: CLINIC | Age: 27
End: 2022-07-14
Payer: COMMERCIAL

## 2022-07-14 VITALS
OXYGEN SATURATION: 97 % | RESPIRATION RATE: 17 BRPM | SYSTOLIC BLOOD PRESSURE: 136 MMHG | WEIGHT: 293 LBS | BODY MASS INDEX: 53.65 KG/M2 | DIASTOLIC BLOOD PRESSURE: 78 MMHG | HEART RATE: 78 BPM

## 2022-07-14 DIAGNOSIS — E66.01 MORBID OBESITY: ICD-10-CM

## 2022-07-14 DIAGNOSIS — Z71.89 COUNSELING AND COORDINATION OF CARE: ICD-10-CM

## 2022-07-14 DIAGNOSIS — R76.8 POSITIVE ANA (ANTINUCLEAR ANTIBODY): ICD-10-CM

## 2022-07-14 DIAGNOSIS — M79.7 FIBROMYALGIA: Primary | ICD-10-CM

## 2022-07-14 PROCEDURE — 99999 PR PBB SHADOW E&M-EST. PATIENT-LVL III: ICD-10-PCS | Mod: PBBFAC,,, | Performed by: INTERNAL MEDICINE

## 2022-07-14 PROCEDURE — 3078F DIAST BP <80 MM HG: CPT | Mod: CPTII,S$GLB,, | Performed by: INTERNAL MEDICINE

## 2022-07-14 PROCEDURE — 3075F SYST BP GE 130 - 139MM HG: CPT | Mod: CPTII,S$GLB,, | Performed by: INTERNAL MEDICINE

## 2022-07-14 PROCEDURE — 99204 PR OFFICE/OUTPT VISIT, NEW, LEVL IV, 45-59 MIN: ICD-10-PCS | Mod: S$GLB,,, | Performed by: INTERNAL MEDICINE

## 2022-07-14 PROCEDURE — 3075F PR MOST RECENT SYSTOLIC BLOOD PRESS GE 130-139MM HG: ICD-10-PCS | Mod: CPTII,S$GLB,, | Performed by: INTERNAL MEDICINE

## 2022-07-14 PROCEDURE — 3078F PR MOST RECENT DIASTOLIC BLOOD PRESSURE < 80 MM HG: ICD-10-PCS | Mod: CPTII,S$GLB,, | Performed by: INTERNAL MEDICINE

## 2022-07-14 PROCEDURE — 3044F PR MOST RECENT HEMOGLOBIN A1C LEVEL <7.0%: ICD-10-PCS | Mod: CPTII,S$GLB,, | Performed by: INTERNAL MEDICINE

## 2022-07-14 PROCEDURE — 99204 OFFICE O/P NEW MOD 45 MIN: CPT | Mod: S$GLB,,, | Performed by: INTERNAL MEDICINE

## 2022-07-14 PROCEDURE — 3044F HG A1C LEVEL LT 7.0%: CPT | Mod: CPTII,S$GLB,, | Performed by: INTERNAL MEDICINE

## 2022-07-14 PROCEDURE — 3008F BODY MASS INDEX DOCD: CPT | Mod: CPTII,S$GLB,, | Performed by: INTERNAL MEDICINE

## 2022-07-14 PROCEDURE — 3008F PR BODY MASS INDEX (BMI) DOCUMENTED: ICD-10-PCS | Mod: CPTII,S$GLB,, | Performed by: INTERNAL MEDICINE

## 2022-07-14 PROCEDURE — 99999 PR PBB SHADOW E&M-EST. PATIENT-LVL III: CPT | Mod: PBBFAC,,, | Performed by: INTERNAL MEDICINE

## 2022-07-14 RX ORDER — PREGABALIN 25 MG/1
25 CAPSULE ORAL 2 TIMES DAILY
Qty: 60 CAPSULE | Refills: 6 | Status: SHIPPED | OUTPATIENT
Start: 2022-07-14 | End: 2022-09-13 | Stop reason: SDUPTHER

## 2022-07-14 RX ORDER — AMITRIPTYLINE HYDROCHLORIDE 10 MG/1
10 TABLET, FILM COATED ORAL NIGHTLY
Qty: 30 TABLET | Refills: 11 | Status: SHIPPED | OUTPATIENT
Start: 2022-07-14 | End: 2022-09-29 | Stop reason: SDUPTHER

## 2022-07-14 NOTE — PROGRESS NOTES
RHEUMATOLOGY OUTPATIENT CLINIC NOTE    7/14/2022    Attending Rheumatologist: Luis Alfredo Villa  Primary Care Provider: Dulce Villalobos MD   Physician Requesting Consultation: Aaareferral Self  No address on file  Chief Complaint/Reason For Consultation:  No chief complaint on file.      Subjective:       HPI  Saul Huff is a 26 y.o. Black or  female with medical history noted below who presents for evaluation of Fibromyalgia.     Patient last seen by Dr. Soto 9/2021, where Gabapentin and Flexeril continued.   She notes she stopped using Gabapentin due to lack of efficacy. She notes taking Tylenol arthritis and Flexeril with relief for a couple of hours. Endorses widespread pain but her shoulder/neck area is the worse. Endorses morning stiffness diffusely lasting about an hour. Poor sleep, fatigue, paraesthesias, myalgias/spasms. No prior sleep study. FHX of SLE. Denies alopecia, pleurisy/serositis, rash/photosensitivity, Raynaud's, Oral/nasal sores.         Review of Systems   Constitutional: Positive for fatigue. Negative for chills, fever and unexpected weight change.   HENT: Negative for mouth sores.    Eyes: Negative for redness and eye dryness.   Respiratory: Negative for cough and shortness of breath.    Cardiovascular: Negative for chest pain.   Gastrointestinal: Negative for abdominal distention, constipation, diarrhea, nausea and vomiting.   Genitourinary: Negative for vaginal dryness.   Musculoskeletal: Positive for arthralgias, back pain, myalgias and neck pain. Negative for gait problem, joint swelling, leg pain, neck stiffness and joint deformity.   Integumentary:  Negative for rash.   Neurological: Positive for numbness and headaches. Negative for weakness.   Hematological: Negative for adenopathy. Does not bruise/bleed easily.   Psychiatric/Behavioral: Positive for sleep disturbance. Negative for confusion and decreased concentration. The patient is not  nervous/anxious.    All other systems reviewed and are negative.       Chronic comorbid conditions affecting medical decision making today:  Past Medical History:   Diagnosis Date    Anxiety      No past surgical history on file.  Family History   Problem Relation Age of Onset    Ovarian cancer Paternal Grandmother     Breast cancer Neg Hx     Colon cancer Neg Hx      Social History     Substance and Sexual Activity   Alcohol Use No     Social History     Tobacco Use   Smoking Status Never Smoker   Smokeless Tobacco Never Used     Social History     Substance and Sexual Activity   Drug Use No       Current Outpatient Medications:     albuterol (PROVENTIL/VENTOLIN HFA) 90 mcg/actuation inhaler, Inhale 2 puffs into the lungs every 6 (six) hours as needed. Rescue, Disp: 18 g, Rfl: 0    cetirizine (ZYRTEC) 10 MG tablet, Take 10 mg by mouth., Disp: , Rfl:     cyclobenzaprine (FLEXERIL) 10 MG tablet, Take 10 mg by mouth 3 (three) times daily as needed., Disp: , Rfl:     fluticasone propionate (FLONASE) 50 mcg/actuation nasal spray, 1 spray by Nasal route., Disp: , Rfl:     KORY FE 1.5/30, 28, 1.5 mg-30 mcg (21)/75 mg (7) tablet, Take 1 tablet by mouth once daily., Disp: , Rfl:     hydrOXYzine pamoate (VISTARIL) 25 MG Cap, TAKE 1 CAPSULE BY MOUTH EVERY DAY AS NEEDED FOR ANXIETY, Disp: , Rfl:     LAMICTAL 25 mg tablet, Take 25 mg by mouth 2 (two) times daily., Disp: , Rfl:     linaCLOtide (LINZESS) 145 mcg Cap capsule, Take 145 mcg by mouth., Disp: , Rfl:     polyethylene glycol (GLYCOLAX) 17 gram/dose powder, Take by mouth., Disp: , Rfl:     urea 20 % Crea, Apply 1 application topically once daily. To dry skin on the feet., Disp: 75 g, Rfl: 10    amitriptyline (ELAVIL) 10 MG tablet, Take 1 tablet (10 mg total) by mouth every evening., Disp: 30 tablet, Rfl: 11    pregabalin (LYRICA) 25 MG capsule, Take 1 capsule (25 mg total) by mouth 2 (two) times daily., Disp: 60 capsule, Rfl: 6     Objective:          Vitals:    07/14/22 1303   BP: 136/78   Pulse: 78   Resp: 17     Physical Exam   Musculoskeletal:      Comments: Can make fist, no synovitis  Knee crepitus  Negative ankle/MTP  Tender Points:  No   Yes  ( )    (x )   Low cervical anterior aspect    ( )    (x )   Costochondral Junction   (x )    ( )   Lateral Epicondyle  ( )    (x )   Suboccipital   ( )    (x )   Trapezius   ( )    (x )   Supraspinatus   (x )    ( )   Gluteal   (x )    ( )   Greater trochanter  (x )    ( )   Knee           Reviewed old and all outside pertinent medical records available.    All lab results personally reviewed and interpreted by me.  Lab Results   Component Value Date    WBC 15.65 (H) 07/01/2022    HGB 12.4 07/01/2022    HCT 37.9 07/01/2022     (H) 07/01/2022    MCH 32.5 (H) 07/01/2022    MCHC 32.7 07/01/2022    RDW 12.0 07/01/2022     07/01/2022    MPV 10.2 07/01/2022       No results found for: NA, K, CL, CO2, GLU, BUN, LABCREA, CALCIUM, PROT, ALBUMIN, BILITOT, AST, ALKPHOS, ALT, GFRAA, GFRNONAA    Lab Results   Component Value Date    COLORU Light Yellow 06/30/2022    SPECGRAV 1,025 06/30/2022    PHUR 5 06/30/2022    KETONESU negative 06/30/2022    NITRITE negative 06/30/2022    UROBILINOGEN normal 06/30/2022       No results found for: CRP    No results found for: SEDRATE, ERYTHROCYTES    No results found for: CHARLES, RF, SEDRATE    No components found for: 25OHVITDTOT, 34EWTZXU0, 61DBQYWH6, METHODNOTE    No results found for: URICACID    No components found for: TSPOTTB        Imaging:  All imaging reviewed and independently interpreted by me.         ASSESSMENT / PLAN:     Saul Huff is a 26 y.o. Black or  female with:      1. Fibromyalgia  - patients symptoms compatible with Fibromyalgia  - discussed diagnosis and management  - stopped Gabapentin due to lack of efficacy   - trial of Lyrica, SE discussed  - trial of Elavil, SE discussed  - continue Flexeril   - sleep hygiene and stress  management discussed  - sleep study  - wt loss  - reassurance and exercise   - Ambulatory referral/consult to Sleep Disorders; Future  - amitriptyline (ELAVIL) 10 MG tablet; Take 1 tablet (10 mg total) by mouth every evening.  Dispense: 30 tablet; Refill: 11  - pregabalin (LYRICA) 25 MG capsule; Take 1 capsule (25 mg total) by mouth 2 (two) times daily.  Dispense: 60 capsule; Refill: 6    2. Positive CHARLES (antinuclear antibody)  - repeat negative, TAWNY Negative  - low likelihood of CTD  - no further work up at this time  - reassurance    3. Other specified counseling  - over 10 minutes spent regarding below topics:  - Immunization counseling done.  - Weight loss counseling done.  - Nutrition and exercise counseling.  - Limitation of alcohol consumption.  - Regular exercise:  Aerobic and resistance.  - Medication counseling provided.    4. Morbid Obesity  - would benefit from decreasing at least 10% of body weight.  - recommended goal of losing 1 lb per week.  - consider nutritionist evaluation.    Follow up in about 8 weeks (around 9/8/2022).    Method of contact with patient concerns: [facility]    Disclaimer:  This note is prepared using voice recognition software and as such is likely to have errors and has not been proof read. Please contact me for questions.     Time spent: 45 minutes in face to face discussion concerning diagnosis, prognosis, review of lab and test results, benefits of treatment as well as management of disease, counseling of patient and coordination of care between various health care providers.  Greater than half the time spent was used for coordination of care and counseling of patient.    Luis Alfredo Villa M.D.  Rheumatology Department   [facility]

## 2022-08-10 ENCOUNTER — PATIENT MESSAGE (OUTPATIENT)
Dept: FAMILY MEDICINE | Facility: CLINIC | Age: 27
End: 2022-08-10
Payer: COMMERCIAL

## 2022-08-18 ENCOUNTER — PATIENT MESSAGE (OUTPATIENT)
Dept: RHEUMATOLOGY | Facility: CLINIC | Age: 27
End: 2022-08-18
Payer: COMMERCIAL

## 2022-08-19 RX ORDER — CYCLOBENZAPRINE HCL 10 MG
10 TABLET ORAL 3 TIMES DAILY PRN
Qty: 90 TABLET | Refills: 11 | Status: SHIPPED | OUTPATIENT
Start: 2022-08-19 | End: 2022-09-29 | Stop reason: SDUPTHER

## 2022-08-20 ENCOUNTER — PATIENT MESSAGE (OUTPATIENT)
Dept: RHEUMATOLOGY | Facility: CLINIC | Age: 27
End: 2022-08-20
Payer: COMMERCIAL

## 2022-08-20 DIAGNOSIS — M79.7 FIBROMYALGIA: Primary | ICD-10-CM

## 2022-08-22 ENCOUNTER — OFFICE VISIT (OUTPATIENT)
Dept: SLEEP MEDICINE | Facility: CLINIC | Age: 27
End: 2022-08-22
Payer: COMMERCIAL

## 2022-08-22 ENCOUNTER — LAB VISIT (OUTPATIENT)
Dept: LAB | Facility: HOSPITAL | Age: 27
End: 2022-08-22
Attending: STUDENT IN AN ORGANIZED HEALTH CARE EDUCATION/TRAINING PROGRAM
Payer: COMMERCIAL

## 2022-08-22 ENCOUNTER — OFFICE VISIT (OUTPATIENT)
Dept: FAMILY MEDICINE | Facility: CLINIC | Age: 27
End: 2022-08-22
Payer: COMMERCIAL

## 2022-08-22 VITALS
WEIGHT: 293 LBS | HEART RATE: 110 BPM | SYSTOLIC BLOOD PRESSURE: 124 MMHG | DIASTOLIC BLOOD PRESSURE: 72 MMHG | BODY MASS INDEX: 48.82 KG/M2 | HEIGHT: 65 IN | TEMPERATURE: 99 F | OXYGEN SATURATION: 98 %

## 2022-08-22 VITALS
SYSTOLIC BLOOD PRESSURE: 138 MMHG | BODY MASS INDEX: 48.82 KG/M2 | HEART RATE: 97 BPM | WEIGHT: 293 LBS | HEIGHT: 65 IN | DIASTOLIC BLOOD PRESSURE: 86 MMHG

## 2022-08-22 DIAGNOSIS — M79.7 FIBROMYALGIA: ICD-10-CM

## 2022-08-22 DIAGNOSIS — R23.2 HOT FLASHES: ICD-10-CM

## 2022-08-22 DIAGNOSIS — R53.83 FATIGUE, UNSPECIFIED TYPE: ICD-10-CM

## 2022-08-22 DIAGNOSIS — J32.4 CHRONIC PANSINUSITIS: ICD-10-CM

## 2022-08-22 DIAGNOSIS — G47.10 HYPERSOMNOLENCE: ICD-10-CM

## 2022-08-22 DIAGNOSIS — Z01.419 WELL WOMAN EXAM: Primary | ICD-10-CM

## 2022-08-22 DIAGNOSIS — F51.09 OTHER INSOMNIA NOT DUE TO A SUBSTANCE OR KNOWN PHYSIOLOGICAL CONDITION: ICD-10-CM

## 2022-08-22 DIAGNOSIS — M79.7 FIBROMYALGIA: Primary | ICD-10-CM

## 2022-08-22 DIAGNOSIS — D72.829 LEUKOCYTOSIS, UNSPECIFIED TYPE: ICD-10-CM

## 2022-08-22 DIAGNOSIS — R35.1 NOCTURIA: ICD-10-CM

## 2022-08-22 LAB
25(OH)D3+25(OH)D2 SERPL-MCNC: 26 NG/ML (ref 30–96)
BASOPHILS # BLD AUTO: 0.1 K/UL (ref 0–0.2)
BASOPHILS # BLD AUTO: 0.1 K/UL (ref 0–0.2)
BASOPHILS NFR BLD: 0.4 % (ref 0–1.9)
BASOPHILS NFR BLD: 0.4 % (ref 0–1.9)
DIFFERENTIAL METHOD: ABNORMAL
DIFFERENTIAL METHOD: ABNORMAL
EOSINOPHIL # BLD AUTO: 0 K/UL (ref 0–0.5)
EOSINOPHIL # BLD AUTO: 0 K/UL (ref 0–0.5)
EOSINOPHIL NFR BLD: 0.1 % (ref 0–8)
EOSINOPHIL NFR BLD: 0.1 % (ref 0–8)
ERYTHROCYTE [DISTWIDTH] IN BLOOD BY AUTOMATED COUNT: 12.3 % (ref 11.5–14.5)
ERYTHROCYTE [DISTWIDTH] IN BLOOD BY AUTOMATED COUNT: 12.3 % (ref 11.5–14.5)
ERYTHROCYTE [SEDIMENTATION RATE] IN BLOOD BY PHOTOMETRIC METHOD: 52 MM/HR (ref 0–36)
ESTIMATED AVG GLUCOSE: 88 MG/DL (ref 68–131)
HBA1C MFR BLD: 4.7 % (ref 4–5.6)
HCT VFR BLD AUTO: 39 % (ref 37–48.5)
HCT VFR BLD AUTO: 39 % (ref 37–48.5)
HGB BLD-MCNC: 13.2 G/DL (ref 12–16)
HGB BLD-MCNC: 13.2 G/DL (ref 12–16)
IMM GRANULOCYTES # BLD AUTO: 0.09 K/UL (ref 0–0.04)
IMM GRANULOCYTES # BLD AUTO: 0.09 K/UL (ref 0–0.04)
IMM GRANULOCYTES NFR BLD AUTO: 0.4 % (ref 0–0.5)
IMM GRANULOCYTES NFR BLD AUTO: 0.4 % (ref 0–0.5)
LYMPHOCYTES # BLD AUTO: 6 K/UL (ref 1–4.8)
LYMPHOCYTES # BLD AUTO: 6 K/UL (ref 1–4.8)
LYMPHOCYTES NFR BLD: 24.8 % (ref 18–48)
LYMPHOCYTES NFR BLD: 24.8 % (ref 18–48)
MCH RBC QN AUTO: 33.7 PG (ref 27–31)
MCH RBC QN AUTO: 33.7 PG (ref 27–31)
MCHC RBC AUTO-ENTMCNC: 33.8 G/DL (ref 32–36)
MCHC RBC AUTO-ENTMCNC: 33.8 G/DL (ref 32–36)
MCV RBC AUTO: 100 FL (ref 82–98)
MCV RBC AUTO: 100 FL (ref 82–98)
MONOCYTES # BLD AUTO: 1.1 K/UL (ref 0.3–1)
MONOCYTES # BLD AUTO: 1.1 K/UL (ref 0.3–1)
MONOCYTES NFR BLD: 4.4 % (ref 4–15)
MONOCYTES NFR BLD: 4.4 % (ref 4–15)
NEUTROPHILS # BLD AUTO: 16.8 K/UL (ref 1.8–7.7)
NEUTROPHILS # BLD AUTO: 16.8 K/UL (ref 1.8–7.7)
NEUTROPHILS NFR BLD: 69.9 % (ref 38–73)
NEUTROPHILS NFR BLD: 69.9 % (ref 38–73)
NRBC BLD-RTO: 0 /100 WBC
NRBC BLD-RTO: 0 /100 WBC
PLATELET # BLD AUTO: 384 K/UL (ref 150–450)
PLATELET # BLD AUTO: 384 K/UL (ref 150–450)
PMV BLD AUTO: 10.7 FL (ref 9.2–12.9)
PMV BLD AUTO: 10.7 FL (ref 9.2–12.9)
RBC # BLD AUTO: 3.92 M/UL (ref 4–5.4)
RBC # BLD AUTO: 3.92 M/UL (ref 4–5.4)
TSH SERPL DL<=0.005 MIU/L-ACNC: 0.82 UIU/ML (ref 0.4–4)
WBC # BLD AUTO: 23.4 K/UL (ref 3.9–12.7)
WBC # BLD AUTO: 23.4 K/UL (ref 3.9–12.7)

## 2022-08-22 PROCEDURE — 3044F PR MOST RECENT HEMOGLOBIN A1C LEVEL <7.0%: ICD-10-PCS | Mod: CPTII,S$GLB,, | Performed by: STUDENT IN AN ORGANIZED HEALTH CARE EDUCATION/TRAINING PROGRAM

## 2022-08-22 PROCEDURE — 83036 HEMOGLOBIN GLYCOSYLATED A1C: CPT | Performed by: STUDENT IN AN ORGANIZED HEALTH CARE EDUCATION/TRAINING PROGRAM

## 2022-08-22 PROCEDURE — 3008F BODY MASS INDEX DOCD: CPT | Mod: CPTII,S$GLB,, | Performed by: STUDENT IN AN ORGANIZED HEALTH CARE EDUCATION/TRAINING PROGRAM

## 2022-08-22 PROCEDURE — 3079F DIAST BP 80-89 MM HG: CPT | Mod: CPTII,S$GLB,, | Performed by: INTERNAL MEDICINE

## 2022-08-22 PROCEDURE — 3078F PR MOST RECENT DIASTOLIC BLOOD PRESSURE < 80 MM HG: ICD-10-PCS | Mod: CPTII,S$GLB,, | Performed by: STUDENT IN AN ORGANIZED HEALTH CARE EDUCATION/TRAINING PROGRAM

## 2022-08-22 PROCEDURE — 1159F PR MEDICATION LIST DOCUMENTED IN MEDICAL RECORD: ICD-10-PCS | Mod: CPTII,S$GLB,, | Performed by: STUDENT IN AN ORGANIZED HEALTH CARE EDUCATION/TRAINING PROGRAM

## 2022-08-22 PROCEDURE — 99214 PR OFFICE/OUTPT VISIT, EST, LEVL IV, 30-39 MIN: ICD-10-PCS | Mod: S$GLB,,, | Performed by: STUDENT IN AN ORGANIZED HEALTH CARE EDUCATION/TRAINING PROGRAM

## 2022-08-22 PROCEDURE — 3079F PR MOST RECENT DIASTOLIC BLOOD PRESSURE 80-89 MM HG: ICD-10-PCS | Mod: CPTII,S$GLB,, | Performed by: INTERNAL MEDICINE

## 2022-08-22 PROCEDURE — 3044F HG A1C LEVEL LT 7.0%: CPT | Mod: CPTII,S$GLB,, | Performed by: STUDENT IN AN ORGANIZED HEALTH CARE EDUCATION/TRAINING PROGRAM

## 2022-08-22 PROCEDURE — 82306 VITAMIN D 25 HYDROXY: CPT | Performed by: STUDENT IN AN ORGANIZED HEALTH CARE EDUCATION/TRAINING PROGRAM

## 2022-08-22 PROCEDURE — 3044F HG A1C LEVEL LT 7.0%: CPT | Mod: CPTII,S$GLB,, | Performed by: INTERNAL MEDICINE

## 2022-08-22 PROCEDURE — 84443 ASSAY THYROID STIM HORMONE: CPT | Performed by: STUDENT IN AN ORGANIZED HEALTH CARE EDUCATION/TRAINING PROGRAM

## 2022-08-22 PROCEDURE — 3008F PR BODY MASS INDEX (BMI) DOCUMENTED: ICD-10-PCS | Mod: CPTII,S$GLB,, | Performed by: INTERNAL MEDICINE

## 2022-08-22 PROCEDURE — 1159F MED LIST DOCD IN RCRD: CPT | Mod: CPTII,S$GLB,, | Performed by: STUDENT IN AN ORGANIZED HEALTH CARE EDUCATION/TRAINING PROGRAM

## 2022-08-22 PROCEDURE — 3075F PR MOST RECENT SYSTOLIC BLOOD PRESS GE 130-139MM HG: ICD-10-PCS | Mod: CPTII,S$GLB,, | Performed by: INTERNAL MEDICINE

## 2022-08-22 PROCEDURE — 3008F BODY MASS INDEX DOCD: CPT | Mod: CPTII,S$GLB,, | Performed by: INTERNAL MEDICINE

## 2022-08-22 PROCEDURE — 99204 PR OFFICE/OUTPT VISIT, NEW, LEVL IV, 45-59 MIN: ICD-10-PCS | Mod: S$GLB,,, | Performed by: INTERNAL MEDICINE

## 2022-08-22 PROCEDURE — 3078F DIAST BP <80 MM HG: CPT | Mod: CPTII,S$GLB,, | Performed by: STUDENT IN AN ORGANIZED HEALTH CARE EDUCATION/TRAINING PROGRAM

## 2022-08-22 PROCEDURE — 99214 OFFICE O/P EST MOD 30 MIN: CPT | Mod: S$GLB,,, | Performed by: STUDENT IN AN ORGANIZED HEALTH CARE EDUCATION/TRAINING PROGRAM

## 2022-08-22 PROCEDURE — 85025 COMPLETE CBC W/AUTO DIFF WBC: CPT | Performed by: STUDENT IN AN ORGANIZED HEALTH CARE EDUCATION/TRAINING PROGRAM

## 2022-08-22 PROCEDURE — 3044F PR MOST RECENT HEMOGLOBIN A1C LEVEL <7.0%: ICD-10-PCS | Mod: CPTII,S$GLB,, | Performed by: INTERNAL MEDICINE

## 2022-08-22 PROCEDURE — 85652 RBC SED RATE AUTOMATED: CPT | Performed by: STUDENT IN AN ORGANIZED HEALTH CARE EDUCATION/TRAINING PROGRAM

## 2022-08-22 PROCEDURE — 99999 PR PBB SHADOW E&M-EST. PATIENT-LVL III: CPT | Mod: PBBFAC,,, | Performed by: INTERNAL MEDICINE

## 2022-08-22 PROCEDURE — 36415 COLL VENOUS BLD VENIPUNCTURE: CPT | Mod: PO | Performed by: STUDENT IN AN ORGANIZED HEALTH CARE EDUCATION/TRAINING PROGRAM

## 2022-08-22 PROCEDURE — 99999 PR PBB SHADOW E&M-EST. PATIENT-LVL V: CPT | Mod: PBBFAC,,, | Performed by: STUDENT IN AN ORGANIZED HEALTH CARE EDUCATION/TRAINING PROGRAM

## 2022-08-22 PROCEDURE — 3075F SYST BP GE 130 - 139MM HG: CPT | Mod: CPTII,S$GLB,, | Performed by: INTERNAL MEDICINE

## 2022-08-22 PROCEDURE — 3008F PR BODY MASS INDEX (BMI) DOCUMENTED: ICD-10-PCS | Mod: CPTII,S$GLB,, | Performed by: STUDENT IN AN ORGANIZED HEALTH CARE EDUCATION/TRAINING PROGRAM

## 2022-08-22 PROCEDURE — 3074F SYST BP LT 130 MM HG: CPT | Mod: CPTII,S$GLB,, | Performed by: STUDENT IN AN ORGANIZED HEALTH CARE EDUCATION/TRAINING PROGRAM

## 2022-08-22 PROCEDURE — 99999 PR PBB SHADOW E&M-EST. PATIENT-LVL III: ICD-10-PCS | Mod: PBBFAC,,, | Performed by: INTERNAL MEDICINE

## 2022-08-22 PROCEDURE — 3074F PR MOST RECENT SYSTOLIC BLOOD PRESSURE < 130 MM HG: ICD-10-PCS | Mod: CPTII,S$GLB,, | Performed by: STUDENT IN AN ORGANIZED HEALTH CARE EDUCATION/TRAINING PROGRAM

## 2022-08-22 PROCEDURE — 1159F MED LIST DOCD IN RCRD: CPT | Mod: CPTII,S$GLB,, | Performed by: INTERNAL MEDICINE

## 2022-08-22 PROCEDURE — 1159F PR MEDICATION LIST DOCUMENTED IN MEDICAL RECORD: ICD-10-PCS | Mod: CPTII,S$GLB,, | Performed by: INTERNAL MEDICINE

## 2022-08-22 PROCEDURE — 99204 OFFICE O/P NEW MOD 45 MIN: CPT | Mod: S$GLB,,, | Performed by: INTERNAL MEDICINE

## 2022-08-22 PROCEDURE — 99999 PR PBB SHADOW E&M-EST. PATIENT-LVL V: ICD-10-PCS | Mod: PBBFAC,,, | Performed by: STUDENT IN AN ORGANIZED HEALTH CARE EDUCATION/TRAINING PROGRAM

## 2022-08-22 NOTE — PROGRESS NOTES
08/25/2022    Saul Huff  [id number]    Chief Complaint   Patient presents with    Excessive Sweating       HPI    Had calluses on feet which were removed  By podiatry but now on hands and fingers  The calluses on her feet are now returning    Excessive sweating  Has been sweating profusely since June  Describes beiing very hot and then it will pass, never last more than an hour    Chronic sinus infection  Present for the last 2 weeks   Currently on abx and steroids right now    Fibromyalgia  Follows with Dr. Villa  Is being treated with cyclobenzaprine and elavil    Negative 10 point ROS outside of HPI    Social History     Socioeconomic History    Marital status: Single   Tobacco Use    Smoking status: Current Every Day Smoker     Packs/day: 0.50     Types: Cigarettes    Smokeless tobacco: Never Used   Substance and Sexual Activity    Alcohol use: No    Drug use: No    Sexual activity: Not Currently           Current Outpatient Medications:     amitriptyline (ELAVIL) 10 MG tablet, Take 1 tablet (10 mg total) by mouth every evening., Disp: 30 tablet, Rfl: 11    cetirizine (ZYRTEC) 10 MG tablet, Take 10 mg by mouth., Disp: , Rfl:     cyclobenzaprine (FLEXERIL) 10 MG tablet, Take 1 tablet (10 mg total) by mouth 3 (three) times daily as needed for Muscle spasms., Disp: 90 tablet, Rfl: 11    fluticasone propionate (FLONASE) 50 mcg/actuation nasal spray, 1 spray by Nasal route., Disp: , Rfl:     KORY FE 1.5/30, 28, 1.5 mg-30 mcg (21)/75 mg (7) tablet, Take 1 tablet by mouth once daily., Disp: , Rfl:     hydrOXYzine pamoate (VISTARIL) 25 MG Cap, TAKE 1 CAPSULE BY MOUTH EVERY DAY AS NEEDED FOR ANXIETY, Disp: , Rfl:     LAMICTAL 25 mg tablet, Take 150 mg by mouth once daily., Disp: , Rfl:     linaCLOtide (LINZESS) 145 mcg Cap capsule, Take 145 mcg by mouth., Disp: , Rfl:     polyethylene glycol (GLYCOLAX) 17 gram/dose powder, Take by mouth., Disp: , Rfl:     pregabalin (LYRICA) 25 MG capsule, Take 1  capsule (25 mg total) by mouth 2 (two) times daily., Disp: 60 capsule, Rfl: 6    urea 20 % Crea, Apply 1 application topically once daily. To dry skin on the feet., Disp: 75 g, Rfl: 10    albuterol (PROVENTIL/VENTOLIN HFA) 90 mcg/actuation inhaler, Inhale 2 puffs into the lungs every 6 (six) hours as needed. Rescue (Patient not taking: Reported on 8/22/2022), Disp: 18 g, Rfl: 0      Physical Exam  Vitals:    08/22/22 1351   BP: 124/72   Pulse: 110   Temp: 99.4 °F (37.4 °C)     Gen: well appearing, NAD  Resp: non labored breathing, no crackles, no wheezes, CTAB  CV: RRR no murmur, gallops, rubs, no LE edema  Abd: soft nontender BS present no organomegaly      1. Well woman exam  - Ambulatory referral/consult to Obstetrics / Gynecology; Future    2. Hot flashes  - TSH; Future  - Vitamin D; Future  - Hemoglobin A1C; Future  - CBC Auto Differential; Future  - Sedimentation rate; Future    3. Chronic pansinusitis  - Ambulatory referral/consult to Allergy; Future    4. Fibromyalgia  Management per Rheum; continue current regimen      RTC pending workup    Dulce Villalobos MD  Family Medicine
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
## 2022-08-24 ENCOUNTER — TELEPHONE (OUTPATIENT)
Dept: FAMILY MEDICINE | Facility: CLINIC | Age: 27
End: 2022-08-24
Payer: COMMERCIAL

## 2022-08-24 ENCOUNTER — PATIENT MESSAGE (OUTPATIENT)
Dept: FAMILY MEDICINE | Facility: CLINIC | Age: 27
End: 2022-08-24
Payer: COMMERCIAL

## 2022-08-24 NOTE — TELEPHONE ENCOUNTER
Pt was called b/c she requested paperwork for her condition for her job. She wanted test results as well.

## 2022-08-25 ENCOUNTER — TELEPHONE (OUTPATIENT)
Dept: SLEEP MEDICINE | Facility: OTHER | Age: 27
End: 2022-08-25
Payer: COMMERCIAL

## 2022-08-25 ENCOUNTER — TELEPHONE (OUTPATIENT)
Dept: FAMILY MEDICINE | Facility: CLINIC | Age: 27
End: 2022-08-25
Payer: COMMERCIAL

## 2022-08-25 NOTE — PROGRESS NOTES
Please let pt know that her labs are consistent with her sinus infection and steroid use. She also has low vitamin d and should take over the counter vitamin d.

## 2022-08-25 NOTE — TELEPHONE ENCOUNTER
----- Message from Dluce Villalobos MD sent at 8/25/2022  1:40 PM CDT -----  Please let pt know that her labs are consistent with her sinus infection and steroid use. She also has low vitamin d and should take over the counter vitamin d.

## 2022-09-06 ENCOUNTER — OFFICE VISIT (OUTPATIENT)
Dept: OBSTETRICS AND GYNECOLOGY | Facility: CLINIC | Age: 27
End: 2022-09-06
Payer: COMMERCIAL

## 2022-09-06 VITALS — BODY MASS INDEX: 53.2 KG/M2 | DIASTOLIC BLOOD PRESSURE: 70 MMHG | SYSTOLIC BLOOD PRESSURE: 120 MMHG | WEIGHT: 293 LBS

## 2022-09-06 DIAGNOSIS — Z01.419 WELL WOMAN EXAM: ICD-10-CM

## 2022-09-06 DIAGNOSIS — Z01.419 WELL WOMAN EXAM WITH ROUTINE GYNECOLOGICAL EXAM: ICD-10-CM

## 2022-09-06 DIAGNOSIS — Z11.3 SCREENING FOR STD (SEXUALLY TRANSMITTED DISEASE): Primary | ICD-10-CM

## 2022-09-06 PROCEDURE — 3044F PR MOST RECENT HEMOGLOBIN A1C LEVEL <7.0%: ICD-10-PCS | Mod: CPTII,S$GLB,, | Performed by: OBSTETRICS & GYNECOLOGY

## 2022-09-06 PROCEDURE — 1159F PR MEDICATION LIST DOCUMENTED IN MEDICAL RECORD: ICD-10-PCS | Mod: CPTII,S$GLB,, | Performed by: OBSTETRICS & GYNECOLOGY

## 2022-09-06 PROCEDURE — 87624 HPV HI-RISK TYP POOLED RSLT: CPT | Performed by: OBSTETRICS & GYNECOLOGY

## 2022-09-06 PROCEDURE — 87591 N.GONORRHOEAE DNA AMP PROB: CPT | Performed by: OBSTETRICS & GYNECOLOGY

## 2022-09-06 PROCEDURE — 99999 PR PBB SHADOW E&M-EST. PATIENT-LVL III: ICD-10-PCS | Mod: PBBFAC,,, | Performed by: OBSTETRICS & GYNECOLOGY

## 2022-09-06 PROCEDURE — 3044F HG A1C LEVEL LT 7.0%: CPT | Mod: CPTII,S$GLB,, | Performed by: OBSTETRICS & GYNECOLOGY

## 2022-09-06 PROCEDURE — 87625 HPV TYPES 16 & 18 ONLY: CPT | Mod: 59 | Performed by: OBSTETRICS & GYNECOLOGY

## 2022-09-06 PROCEDURE — 99385 PREV VISIT NEW AGE 18-39: CPT | Mod: S$GLB,,, | Performed by: OBSTETRICS & GYNECOLOGY

## 2022-09-06 PROCEDURE — 3074F PR MOST RECENT SYSTOLIC BLOOD PRESSURE < 130 MM HG: ICD-10-PCS | Mod: CPTII,S$GLB,, | Performed by: OBSTETRICS & GYNECOLOGY

## 2022-09-06 PROCEDURE — 1159F MED LIST DOCD IN RCRD: CPT | Mod: CPTII,S$GLB,, | Performed by: OBSTETRICS & GYNECOLOGY

## 2022-09-06 PROCEDURE — 3008F BODY MASS INDEX DOCD: CPT | Mod: CPTII,S$GLB,, | Performed by: OBSTETRICS & GYNECOLOGY

## 2022-09-06 PROCEDURE — 3078F DIAST BP <80 MM HG: CPT | Mod: CPTII,S$GLB,, | Performed by: OBSTETRICS & GYNECOLOGY

## 2022-09-06 PROCEDURE — 3078F PR MOST RECENT DIASTOLIC BLOOD PRESSURE < 80 MM HG: ICD-10-PCS | Mod: CPTII,S$GLB,, | Performed by: OBSTETRICS & GYNECOLOGY

## 2022-09-06 PROCEDURE — 3008F PR BODY MASS INDEX (BMI) DOCUMENTED: ICD-10-PCS | Mod: CPTII,S$GLB,, | Performed by: OBSTETRICS & GYNECOLOGY

## 2022-09-06 PROCEDURE — 3074F SYST BP LT 130 MM HG: CPT | Mod: CPTII,S$GLB,, | Performed by: OBSTETRICS & GYNECOLOGY

## 2022-09-06 PROCEDURE — 88175 CYTOPATH C/V AUTO FLUID REDO: CPT | Performed by: OBSTETRICS & GYNECOLOGY

## 2022-09-06 PROCEDURE — 87491 CHLMYD TRACH DNA AMP PROBE: CPT | Performed by: OBSTETRICS & GYNECOLOGY

## 2022-09-06 PROCEDURE — 1160F PR REVIEW ALL MEDS BY PRESCRIBER/CLIN PHARMACIST DOCUMENTED: ICD-10-PCS | Mod: CPTII,S$GLB,, | Performed by: OBSTETRICS & GYNECOLOGY

## 2022-09-06 PROCEDURE — 99385 PR PREVENTIVE VISIT,NEW,18-39: ICD-10-PCS | Mod: S$GLB,,, | Performed by: OBSTETRICS & GYNECOLOGY

## 2022-09-06 PROCEDURE — 99999 PR PBB SHADOW E&M-EST. PATIENT-LVL III: CPT | Mod: PBBFAC,,, | Performed by: OBSTETRICS & GYNECOLOGY

## 2022-09-06 PROCEDURE — 1160F RVW MEDS BY RX/DR IN RCRD: CPT | Mod: CPTII,S$GLB,, | Performed by: OBSTETRICS & GYNECOLOGY

## 2022-09-06 RX ORDER — NORETHINDRONE ACETATE AND ETHINYL ESTRADIOL AND FERROUS FUMARATE 1.5-30(21)
1 KIT ORAL DAILY
Qty: 90 TABLET | Refills: 4 | Status: SHIPPED | OUTPATIENT
Start: 2022-09-06 | End: 2022-09-26 | Stop reason: SDUPTHER

## 2022-09-06 NOTE — PROGRESS NOTES
CC: Annual check-up    SUBJECTIVE:   26 y.o. female   for annual routine Pap and checkup. Patient's last menstrual period was 2022..  She has no unusual complaints.    Saw mario on WB had an ASCUS pap with + HR HPV  Colpo livh ECC was don e  and was nml  Occ will miss a cycle with ocp's but no BTB or heavy vb  Completed gardasil as a teenager, is a teacher  Past Medical History:   Diagnosis Date    Anxiety      No past surgical history on file.  Social History     Socioeconomic History    Marital status: Single   Tobacco Use    Smoking status: Every Day     Packs/day: 0.50     Types: Cigarettes    Smokeless tobacco: Never   Substance and Sexual Activity    Alcohol use: No    Drug use: No    Sexual activity: Not Currently     Family History   Problem Relation Age of Onset    Ovarian cancer Paternal Grandmother     Breast cancer Neg Hx     Colon cancer Neg Hx      OB History    Para Term  AB Living   0 0 0 0 0 0   SAB IAB Ectopic Multiple Live Births   0 0 0 0           Current Outpatient Medications   Medication Sig Dispense Refill    amitriptyline (ELAVIL) 10 MG tablet Take 1 tablet (10 mg total) by mouth every evening. 30 tablet 11    cetirizine (ZYRTEC) 10 MG tablet Take 10 mg by mouth.      cyclobenzaprine (FLEXERIL) 10 MG tablet Take 1 tablet (10 mg total) by mouth 3 (three) times daily as needed for Muscle spasms. 90 tablet 11    fluticasone propionate (FLONASE) 50 mcg/actuation nasal spray 1 spray by Nasal route.      KORY FE 1.5/30, 28, 1.5 mg-30 mcg (21)/75 mg (7) tablet Take 1 tablet by mouth once daily. 90 tablet 4    hydrOXYzine pamoate (VISTARIL) 25 MG Cap TAKE 1 CAPSULE BY MOUTH EVERY DAY AS NEEDED FOR ANXIETY      LAMICTAL 25 mg tablet Take 150 mg by mouth once daily.      polyethylene glycol (GLYCOLAX) 17 gram/dose powder Take by mouth.      pregabalin (LYRICA) 25 MG capsule Take 1 capsule (25 mg total) by mouth 2 (two) times daily. 60 capsule 6    urea 20 % Crea  Apply 1 application topically once daily. To dry skin on the feet. 75 g 10    albuterol (PROVENTIL/VENTOLIN HFA) 90 mcg/actuation inhaler Inhale 2 puffs into the lungs every 6 (six) hours as needed. Rescue (Patient not taking: Reported on 8/22/2022) 18 g 0    linaCLOtide (LINZESS) 145 mcg Cap capsule Take 145 mcg by mouth.       No current facility-administered medications for this visit.     Allergies: Patient has no known allergies.     ROS:  Constitutional: no weight loss, weight gain, fever, fatigue  Eyes:  No vision changes, glasses/contacts  ENT/Mouth: No ulcers, sinus problems, ears ringing, headache  Cardiovascular: No inability to lie flat, chest pain, exercise intolerance, swelling, heart palpitations  Respiratory: No wheezing, coughing blood, shortness of breath, or cough  Gastrointestinal: No diarrhea, bloody stool, nausea/vomiting, constipation, gas, hemorrhoids  Genitourinary: No blood in urine, painful urination, urgency of urination, frequency of urination, incomplete emptying, incontinence, abnormal bleeding, painful periods, heavy periods, vaginal discharge, vaginal odor, painful intercourse, sexual problems, bleeding after intercourse.  Musculoskeletal: No muscle weakness  Skin/Breast: No painful breasts, nipple discharge, masses, rash, ulcers  Neurological: No passing out, seizures, numbness, headache  Endocrine: No diabetes, hypothyroid, hyperthyroid, hot flashes, hair loss, abnormal hair growth, ance  Psychiatric: No depression, crying  Hematologic: No bruises, bleeding, swollen lymph nodes, anemia.      OBJECTIVE:   The patient appears well, alert, oriented x 3, in no distress.  /70   Wt (!) 145 kg (319 lb 10.7 oz)   LMP 08/30/2022   BMI 53.20 kg/m²   NECK: no thyromegaly, trachea midline  SKIN: no acne, striae, hirsutism  BREAST EXAM: breasts appear normal, no suspicious masses, no skin or nipple changes or axillary nodes, symmetric fibrous changes in both upper outer  quadrants  ABDOMEN:  obese small umb hernia  GENITALIA: normal external genitalia, no erythema, no discharge  URETHRA: normal urethra, normal urethral meatus  VAGINA: Normal  CERVIX: no lesions or cervical motion tenderness and need a long speculum  UTERUS: normal  ADNEXA: normal adnexa and no mass, fullness, tenderness      ASSESSMENT:   well woman  1. Screening for STD (sexually transmitted disease)    2. Well woman exam    3. Well woman exam with routine gynecological exam        PLAN:   mammogram  pap smear  return annually or prn  Orders Placed This Encounter    C. trachomatis/N. gonorrhoeae by AMP DNA    HPV High Risk Genotypes, PCR    HIV 1/2 Ag/Ab (4th Gen)    RPR    Liquid-Based Pap Smear, Screening    KORY FE 1.5/30, 28, 1.5 mg-30 mcg (21)/75 mg (7) tablet     sm

## 2022-09-08 LAB
C TRACH DNA SPEC QL NAA+PROBE: NOT DETECTED
N GONORRHOEA DNA SPEC QL NAA+PROBE: NOT DETECTED

## 2022-09-12 ENCOUNTER — PATIENT MESSAGE (OUTPATIENT)
Dept: RHEUMATOLOGY | Facility: CLINIC | Age: 27
End: 2022-09-12
Payer: COMMERCIAL

## 2022-09-12 DIAGNOSIS — M79.7 FIBROMYALGIA: ICD-10-CM

## 2022-09-13 ENCOUNTER — PATIENT MESSAGE (OUTPATIENT)
Dept: RHEUMATOLOGY | Facility: CLINIC | Age: 27
End: 2022-09-13
Payer: COMMERCIAL

## 2022-09-13 DIAGNOSIS — M79.7 FIBROMYALGIA: ICD-10-CM

## 2022-09-13 RX ORDER — PREGABALIN 75 MG/1
75 CAPSULE ORAL 2 TIMES DAILY
Qty: 60 CAPSULE | Refills: 6 | Status: SHIPPED | OUTPATIENT
Start: 2022-09-13 | End: 2022-09-19 | Stop reason: SDUPTHER

## 2022-09-14 ENCOUNTER — TELEPHONE (OUTPATIENT)
Dept: SLEEP MEDICINE | Facility: OTHER | Age: 27
End: 2022-09-14
Payer: COMMERCIAL

## 2022-09-14 LAB
CLINICAL INFO: NORMAL
CYTO CVX: NORMAL
CYTOLOGIST CVX/VAG CYTO: NORMAL
CYTOLOGIST CVX/VAG CYTO: NORMAL
CYTOLOGY CMNT CVX/VAG CYTO-IMP: NORMAL
CYTOLOGY PAP THIN PREP EXPLANATION: NORMAL
DATE OF PREVIOUS PAP: NORMAL
DATE PREVIOUS BX: YES
GEN CATEG CVX/VAG CYTO-IMP: NORMAL
HPV I/H RISK 4 DNA CVX QL NAA+PROBE: DETECTED
HPV16 DNA CVX QL PROBE+SIG AMP: NOT DETECTED
HPV18 DNA CVX QL PROBE+SIG AMP: NOT DETECTED
LMP START DATE: NORMAL
MICROORGANISM CVX/VAG CYTO: NORMAL
PATHOLOGIST CVX/VAG CYTO: NORMAL
SERVICE CMNT-IMP: NORMAL
SPECIMEN SOURCE CVX/VAG CYTO: NORMAL
STAT OF ADQ CVX/VAG CYTO-IMP: NORMAL

## 2022-09-14 NOTE — TELEPHONE ENCOUNTER
Patient is trying to get on with Medicaid,she will call us back when insurance changes to schedule the home sleep study.

## 2022-09-19 ENCOUNTER — CLINICAL SUPPORT (OUTPATIENT)
Dept: REHABILITATION | Facility: HOSPITAL | Age: 27
End: 2022-09-19
Attending: INTERNAL MEDICINE
Payer: COMMERCIAL

## 2022-09-19 DIAGNOSIS — R29.898 WEAKNESS OF RIGHT LOWER EXTREMITY: ICD-10-CM

## 2022-09-19 DIAGNOSIS — M79.7 FIBROMYALGIA: ICD-10-CM

## 2022-09-19 DIAGNOSIS — R29.3 POSTURE ABNORMALITY: ICD-10-CM

## 2022-09-19 PROCEDURE — 97161 PT EVAL LOW COMPLEX 20 MIN: CPT | Mod: PO

## 2022-09-19 PROCEDURE — 97110 THERAPEUTIC EXERCISES: CPT | Mod: PO

## 2022-09-20 RX ORDER — PREGABALIN 75 MG/1
75 CAPSULE ORAL 2 TIMES DAILY
Qty: 60 CAPSULE | Refills: 6 | Status: SHIPPED | OUTPATIENT
Start: 2022-09-20 | End: 2022-09-29 | Stop reason: SDUPTHER

## 2022-09-21 PROBLEM — R29.898 WEAKNESS OF RIGHT LOWER EXTREMITY: Status: ACTIVE | Noted: 2022-09-21

## 2022-09-21 PROBLEM — R29.3 POSTURE ABNORMALITY: Status: ACTIVE | Noted: 2022-09-21

## 2022-09-21 NOTE — PLAN OF CARE
[facility]  Physical Therapy Initial Evaluation    Name: Saul Huff  Clinic Number: [id number]    Therapy Diagnosis:   Encounter Diagnoses   Name Primary?    Fibromyalgia     Weakness of right lower extremity     Posture abnormality      Physician: Luis Alfredo Villa MD    Physician Orders: PT Eval and Treat   Medical Diagnosis from Referral: M79.7 (ICD-10-CM) - Fibromyalgia  Evaluation Date: 9/19/2022  Authorization Period Expiration: 8/22/2023  Plan of Care Expiration: 12/12/2022  Progress Note Due: 10/19/2022  Visit # / Visits authorized: 1/ 1    Time In: 400pm  Time Out: 445pm  Total Billable Time: 45 minutes    Precautions: Standard    Subjective   Date of onset: about 3 months ago  History of current condition - Saul reports: that back in June she started to notice that when she stood up too long or walked too long her R knee would start to hurt. The pain is on the anterior/lateral portion of the knee. She is a  and has trouble getting through a full day because of the pain. She notices that at the end of the week her knee pain is worse than it is at the beginning of the week. She used to exercise but has been unable to recently because of the pain. Her pain tends to be worst with prolonged standing, walking and stair navigation. Her pain improves with rest and occasional medication. She has trouble getting onto and off of the ground as well because of the pain.        Past Medical History:   Diagnosis Date    Anxiety      Saul Huff  has no past surgical history on file.    Saul has a current medication list which includes the following prescription(s): albuterol, amitriptyline, cetirizine, cyclobenzaprine, fluticasone propionate, lisa fe 1.5/30 (28), hydroxyzine pamoate, lamictal, linaclotide, polyethylene glycol, pregabalin, and urea.    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  Prior Level of Function: no pain or difficulty with ADLs and work activities   Current Level of Function: moderate pain with ADLs and work activities     Pain:  Current 2/10, worst 7/10, best 2/10   Location: right knee   Description: Aching, Dull, and Sharp  Aggravating Factors: prolonged standing, walking and stair navigation  Easing Factors: pain medication and rest    Pts goals: to get her knee pain under control so she can be more active    Objective     Range of Motion:   Knee Left active Left Passive Right Active R passive   Flexion 125 130 125 130   Extension 0 +10 0 +10   - No pain reported with end range knee motion today      Lower Extremity Strength  Right LE  Left LE    Knee extension: 4/5 Knee extension: 4+/5   Knee flexion: 4/5 Knee flexion: 4+/5   Hip flexion: 3+/5 Hip flexion: 3+/5   Hip extension:  3/5 Hip extension: 3+/5   Hip abduction: 3/5 Hip abduction: 3+/5       Special Tests:   Left Right   Valgus Stress Test - -   Varus Stress test - -   Lachman's test - -   Posterior Lachman - -   Alix's Test - -     Step down test:   - Right: excessive knee valgus with L sided hip drop and knee pain was reported  - Left: slight knee valgus, pain free    Function:    - Squat: good posterior weight shift, excessive knee valgus throughout    Joint Mobility: normal patellar mobility. There is a medial tibial shift on the R knee at rest. There is decreased tibial ER and IR on the R side.    Palpation: no TTP reported today    Sensation: intact    Edema: none noted today      CMS Impairment/Limitation/Restriction for FOTO Knee Survey    Therapist reviewed FOTO scores for Saul Huff on 9/19/2022.   FOTO documents entered into [employer] - see Media section.    Limitation Score: TBD         TREATMENT   Treatment Time In: 435pm  Treatment Time Out: 445pm  Total Treatment time separate from Evaluation: 10  minutes    Saul received therapeutic exercises to develop strength, endurance, and ROM for 10 minutes including:    SLRs, 15x  Bridges, 15x  SL clamshells OTB, 15x on the R      Home Exercises and Patient Education Provided    Education provided re:   - HEP  - Plan of care  - Importance of hip and quad strength for good knee function    Written Home Exercises Provided: Yes.  Exercises were reviewed and Saul was able to demonstrate them prior to the end of the session.   Pt received a written copy of exercises to perform at home. Saul demonstrated good  understanding of the education provided.     Assessment   Saul is a 26 y.o. female referred to outpatient Physical Therapy with a medical diagnosis of Fibromyalgia. She presents PT today with complaints of R knee pain. She displays with R quad and hip weakness, decreased R tibiofemoral accessory motions, poor resting tibial position, impaired squat form and impaired step down performance.     Pt prognosis is Good.   Pt will benefit from skilled outpatient Physical Therapy to address the deficits stated above and in the chart below, provide pt/family education, and to maximize pt's level of independence.     Plan of care discussed with patient: Yes  Pt's spiritual, cultural and educational needs considered and patient is agreeable to the plan of care and goals as stated below:     Anticipated Barriers for therapy: none    Medical Necessity is demonstrated by the following  History  Co-morbidities and personal factors that may impact the plan of care Co-morbidities:   anxiety and fibromyalgia    Personal Factors:   no deficits     moderate   Examination  Body Structures and Functions, activity limitations and participation restrictions that may impact the plan of care Body Regions:   lower extremities    Body Systems:    strength  balance  motor control    Participation Restrictions:   none    Activity limitations:   Learning and applying knowledge  no  "deficits    General Tasks and Commands  no deficits    Communication  no deficits    Mobility  lifting and carrying objects  walking    Self care  no deficits    Domestic Life  shopping  cooking  doing house work (cleaning house, washing dishes, laundry)    Interactions/Relationships  no deficits    Life Areas  no deficits    Community and Social Life  no deficits         moderate   Clinical Presentation stable and uncomplicated low   Decision Making/ Complexity Score: low     Goals:  Short Term Goals (6 Weeks):   1. Pt will be compliant with HEP to supplement PT in restoring pain free function.  2. Pt will be able to perform a 6" step down without an L hip drop to demonstrate improved functional performance.   3. Pt will improve impaired LE strength by 1/2 MMT grade to improve strength for functional tasks  Long Term Goals (12 Weeks):  1. Pt will report no pain during her work day to demonstrate an improvement in her overall QOL.  2. Pt will be able to exercise in order to demonstrate improved ability to perform physical activity.   3. Pt will improve impaired LE strength by 1/2 MMT grade to improve strength for functional tasks      Plan   Plan of care Certification: 9/19/2022 to 12/12/2022.    Outpatient Physical Therapy 1-2 times weekly for 12 weeks to include the following interventions: Gait Training, Manual Therapy, Moist Heat/ Ice, Neuromuscular Re-ed, Patient Education, Self Care, Therapeutic Activities, and Therapeutic Exercise.     NUNU JAMISON, PT      "

## 2022-09-24 ENCOUNTER — PATIENT MESSAGE (OUTPATIENT)
Dept: FAMILY MEDICINE | Facility: CLINIC | Age: 27
End: 2022-09-24
Payer: COMMERCIAL

## 2022-09-25 ENCOUNTER — PATIENT MESSAGE (OUTPATIENT)
Dept: REHABILITATION | Facility: HOSPITAL | Age: 27
End: 2022-09-25
Payer: COMMERCIAL

## 2022-09-25 ENCOUNTER — PATIENT MESSAGE (OUTPATIENT)
Dept: RHEUMATOLOGY | Facility: CLINIC | Age: 27
End: 2022-09-25
Payer: COMMERCIAL

## 2022-09-29 ENCOUNTER — OFFICE VISIT (OUTPATIENT)
Dept: RHEUMATOLOGY | Facility: CLINIC | Age: 27
End: 2022-09-29
Payer: COMMERCIAL

## 2022-09-29 VITALS
HEIGHT: 65 IN | WEIGHT: 293 LBS | DIASTOLIC BLOOD PRESSURE: 73 MMHG | BODY MASS INDEX: 48.82 KG/M2 | SYSTOLIC BLOOD PRESSURE: 117 MMHG | HEART RATE: 110 BPM | RESPIRATION RATE: 20 BRPM | OXYGEN SATURATION: 98 %

## 2022-09-29 DIAGNOSIS — Z71.89 COUNSELING AND COORDINATION OF CARE: ICD-10-CM

## 2022-09-29 DIAGNOSIS — R76.8 POSITIVE ANA (ANTINUCLEAR ANTIBODY): ICD-10-CM

## 2022-09-29 DIAGNOSIS — M79.7 FIBROMYALGIA: Primary | ICD-10-CM

## 2022-09-29 DIAGNOSIS — E66.01 MORBID OBESITY: ICD-10-CM

## 2022-09-29 PROCEDURE — 3044F HG A1C LEVEL LT 7.0%: CPT | Mod: CPTII,S$GLB,, | Performed by: INTERNAL MEDICINE

## 2022-09-29 PROCEDURE — 99214 PR OFFICE/OUTPT VISIT, EST, LEVL IV, 30-39 MIN: ICD-10-PCS | Mod: S$GLB,,, | Performed by: INTERNAL MEDICINE

## 2022-09-29 PROCEDURE — 3074F PR MOST RECENT SYSTOLIC BLOOD PRESSURE < 130 MM HG: ICD-10-PCS | Mod: CPTII,S$GLB,, | Performed by: INTERNAL MEDICINE

## 2022-09-29 PROCEDURE — 3008F PR BODY MASS INDEX (BMI) DOCUMENTED: ICD-10-PCS | Mod: CPTII,S$GLB,, | Performed by: INTERNAL MEDICINE

## 2022-09-29 PROCEDURE — 99999 PR PBB SHADOW E&M-EST. PATIENT-LVL III: ICD-10-PCS | Mod: PBBFAC,,, | Performed by: INTERNAL MEDICINE

## 2022-09-29 PROCEDURE — 1159F MED LIST DOCD IN RCRD: CPT | Mod: CPTII,S$GLB,, | Performed by: INTERNAL MEDICINE

## 2022-09-29 PROCEDURE — 99214 OFFICE O/P EST MOD 30 MIN: CPT | Mod: S$GLB,,, | Performed by: INTERNAL MEDICINE

## 2022-09-29 PROCEDURE — 1159F PR MEDICATION LIST DOCUMENTED IN MEDICAL RECORD: ICD-10-PCS | Mod: CPTII,S$GLB,, | Performed by: INTERNAL MEDICINE

## 2022-09-29 PROCEDURE — 3078F DIAST BP <80 MM HG: CPT | Mod: CPTII,S$GLB,, | Performed by: INTERNAL MEDICINE

## 2022-09-29 PROCEDURE — 3044F PR MOST RECENT HEMOGLOBIN A1C LEVEL <7.0%: ICD-10-PCS | Mod: CPTII,S$GLB,, | Performed by: INTERNAL MEDICINE

## 2022-09-29 PROCEDURE — 3078F PR MOST RECENT DIASTOLIC BLOOD PRESSURE < 80 MM HG: ICD-10-PCS | Mod: CPTII,S$GLB,, | Performed by: INTERNAL MEDICINE

## 2022-09-29 PROCEDURE — 99999 PR PBB SHADOW E&M-EST. PATIENT-LVL III: CPT | Mod: PBBFAC,,, | Performed by: INTERNAL MEDICINE

## 2022-09-29 PROCEDURE — 3008F BODY MASS INDEX DOCD: CPT | Mod: CPTII,S$GLB,, | Performed by: INTERNAL MEDICINE

## 2022-09-29 PROCEDURE — 3074F SYST BP LT 130 MM HG: CPT | Mod: CPTII,S$GLB,, | Performed by: INTERNAL MEDICINE

## 2022-09-29 RX ORDER — AMITRIPTYLINE HYDROCHLORIDE 50 MG/1
50 TABLET, FILM COATED ORAL NIGHTLY
Qty: 30 TABLET | Refills: 11 | Status: SHIPPED | OUTPATIENT
Start: 2022-09-29 | End: 2023-09-29

## 2022-09-29 RX ORDER — CYCLOBENZAPRINE HCL 10 MG
10 TABLET ORAL 3 TIMES DAILY PRN
Qty: 90 TABLET | Refills: 11 | Status: SHIPPED | OUTPATIENT
Start: 2022-09-29

## 2022-09-29 RX ORDER — PREGABALIN 100 MG/1
100 CAPSULE ORAL 2 TIMES DAILY
Qty: 60 CAPSULE | Refills: 6 | Status: SHIPPED | OUTPATIENT
Start: 2022-09-29 | End: 2023-03-30

## 2022-09-29 NOTE — PROGRESS NOTES
RHEUMATOLOGY OUTPATIENT CLINIC NOTE    9/29/2022    Attending Rheumatologist: Luis Alfredo Villa  Primary Care Provider: Dulce Villalobos MD   Physician Requesting Consultation: No referring provider defined for this encounter.  Chief Complaint/Reason For Consultation:  No chief complaint on file.      Subjective:       HPI  Saul Huff is a 26 y.o. Black or  female with medical history noted below who presents for evaluation of Fibromyalgia.   Patient last seen by Dr. Soto 9/2021, where Gabapentin and Flexeril continued.   She notes she stopped using Gabapentin due to lack of efficacy. She notes taking Tylenol arthritis and Flexeril with relief for a couple of hours. Endorses widespread pain but her shoulder/neck area is the worse. Endorses morning stiffness diffusely lasting about an hour. Poor sleep, fatigue, paraesthesias, myalgias/spasms. No prior sleep study. FHX of SLE. Denies alopecia, pleurisy/serositis, rash/photosensitivity, Raynaud's, Oral/nasal sores.     Today  Patient here for follow up.   Last visit management for FM stated. She notes tolerating the Lyrica and would like to go up on the dose. Also reports the Elavil helps her fall asleep but not stay asleep. Pending Sleep study. She notes she is working on getting medicaid as her medical expenses are getting to expensive. Notes prior THC use with relief. Notes taking Tylenol daily for pain. Tolerating meds. Rest per ROS.     Review of Systems   Constitutional:  Positive for fatigue. Negative for chills, fever and unexpected weight change.   HENT:  Negative for mouth sores.    Eyes:  Negative for redness and eye dryness.   Respiratory:  Negative for cough and shortness of breath.    Cardiovascular:  Negative for chest pain.   Gastrointestinal:  Negative for abdominal distention, constipation, diarrhea, nausea and vomiting.   Genitourinary:  Negative for vaginal dryness.   Musculoskeletal:  Positive for arthralgias, back  pain, myalgias and neck pain. Negative for gait problem, joint swelling, leg pain, neck stiffness and joint deformity.   Integumentary:  Negative for rash.   Neurological:  Positive for numbness and headaches. Negative for weakness.   Hematological:  Negative for adenopathy. Does not bruise/bleed easily.   Psychiatric/Behavioral:  Positive for sleep disturbance. Negative for confusion and decreased concentration. The patient is not nervous/anxious.    All other systems reviewed and are negative.     Chronic comorbid conditions affecting medical decision making today:  Past Medical History:   Diagnosis Date    Anxiety      No past surgical history on file.  Family History   Problem Relation Age of Onset    Ovarian cancer Paternal Grandmother     Breast cancer Neg Hx     Colon cancer Neg Hx      Social History     Substance and Sexual Activity   Alcohol Use No     Social History     Tobacco Use   Smoking Status Every Day    Packs/day: 0.50    Types: Cigarettes   Smokeless Tobacco Never     Social History     Substance and Sexual Activity   Drug Use No       Current Outpatient Medications:     albuterol (PROVENTIL/VENTOLIN HFA) 90 mcg/actuation inhaler, Inhale 2 puffs into the lungs every 6 (six) hours as needed. Rescue (Patient not taking: Reported on 8/22/2022), Disp: 18 g, Rfl: 0    amitriptyline (ELAVIL) 50 MG tablet, Take 1 tablet (50 mg total) by mouth every evening., Disp: 30 tablet, Rfl: 11    cetirizine (ZYRTEC) 10 MG tablet, Take 10 mg by mouth., Disp: , Rfl:     cyclobenzaprine (FLEXERIL) 10 MG tablet, Take 1 tablet (10 mg total) by mouth 3 (three) times daily as needed for Muscle spasms., Disp: 90 tablet, Rfl: 11    fluticasone propionate (FLONASE) 50 mcg/actuation nasal spray, 1 spray by Nasal route., Disp: , Rfl:     KORY FE 1.5/30, 28, 1.5 mg-30 mcg (21)/75 mg (7) tablet, Take 1 tablet by mouth once daily., Disp: 90 tablet, Rfl: 4    hydrOXYzine pamoate (VISTARIL) 25 MG Cap, TAKE 1 CAPSULE BY MOUTH  EVERY DAY AS NEEDED FOR ANXIETY, Disp: , Rfl:     LAMICTAL 25 mg tablet, Take 150 mg by mouth once daily., Disp: , Rfl:     linaCLOtide (LINZESS) 145 mcg Cap capsule, Take 145 mcg by mouth., Disp: , Rfl:     polyethylene glycol (GLYCOLAX) 17 gram/dose powder, Take by mouth., Disp: , Rfl:     pregabalin (LYRICA) 100 MG capsule, Take 1 capsule (100 mg total) by mouth 2 (two) times daily., Disp: 60 capsule, Rfl: 6    urea 20 % Crea, Apply 1 application topically once daily. To dry skin on the feet., Disp: 75 g, Rfl: 10     Objective:         Vitals:    09/29/22 0813   BP: 117/73   Pulse: 110   Resp: 20     Physical Exam   Musculoskeletal:      Comments: Can make fist, no synovitis  Knee crepitus  Negative ankle/MTP  Tender Points:  No   Yes  [ ]    [x ]   Low cervical anterior aspect    [ ]    [x ]   Costochondral Junction   [x ]    [ ]   Lateral Epicondyle  [ ]    [x ]   Suboccipital   [ ]    [x ]   Trapezius   [ ]    [x ]   Supraspinatus   [x ]    [ ]   Gluteal   [x ]    [ ]   Greater trochanter  [x ]    [ ]   Knee         Reviewed old and all outside pertinent medical records available.    All lab results personally reviewed and interpreted by me.  Lab Results   Component Value Date    WBC 23.40 (H) 08/22/2022    WBC 23.40 (H) 08/22/2022    HGB 13.2 08/22/2022    HGB 13.2 08/22/2022    HCT 39.0 08/22/2022    HCT 39.0 08/22/2022     (H) 08/22/2022     (H) 08/22/2022    MCH 33.7 (H) 08/22/2022    MCH 33.7 (H) 08/22/2022    MCHC 33.8 08/22/2022    MCHC 33.8 08/22/2022    RDW 12.3 08/22/2022    RDW 12.3 08/22/2022     08/22/2022     08/22/2022    MPV 10.7 08/22/2022    MPV 10.7 08/22/2022       No results found for: NA, K, CL, CO2, GLU, BUN, LABCREA, CALCIUM, PROT, ALBUMIN, BILITOT, AST, ALKPHOS, ALT, GFRAA, GFRNONAA    Lab Results   Component Value Date    COLORU Light Yellow 06/30/2022    SPECGRAV 1,025 06/30/2022    PHUR 5 06/30/2022    KETONESU negative 06/30/2022    NITRITE negative  06/30/2022    UROBILINOGEN normal 06/30/2022       No results found for: CRP    Lab Results   Component Value Date    SEDRATE 52 (H) 08/22/2022       Lab Results   Component Value Date    SEDRATE 52 (H) 08/22/2022       No components found for: 25OHVITDTOT, 29SFAOHL2, 75WQBTIT0, METHODNOTE    No results found for: URICACID    No components found for: TSPOTTB        Imaging:  All imaging reviewed and independently interpreted by me.         ASSESSMENT / PLAN:     Saul Huff is a 26 y.o. Black or  female with:      1. Fibromyalgia  - improving   - stopped Gabapentin due to lack of efficacy   - increase Lyrica to 100mg BID  - increase Elavil to 50mg qhs  - continue Flexeril   - sleep hygiene and stress management reinforced   - sleep study Pending   - wt loss  - reassurance and exercise     2. Positive CHARLES (antinuclear antibody)  - repeat negative, TAWNY Negative  - low likelihood of CTD  - no further work up at this time  - reassurance    3. Other specified counseling  - over 10 minutes spent regarding below topics:  - Immunization counseling done.  - Weight loss counseling done.  - Nutrition and exercise counseling.  - Limitation of alcohol consumption.  - Regular exercise:  Aerobic and resistance.  - Medication counseling provided.    4. Morbid Obesity  - would benefit from decreasing at least 10% of body weight.  - recommended goal of losing 1 lb per week.  - consider nutritionist evaluation.    Follow up in about 3 months (around 12/29/2022).    Method of contact with patient concerns: [facility]    Disclaimer:  This note is prepared using voice recognition software and as such is likely to have errors and has not been proof read. Please contact me for questions.     Time spent: 30 minutes in face to face discussion concerning diagnosis, prognosis, review of lab and test results, benefits of treatment as well as management of disease, counseling of patient and coordination of  care between various health care providers.  Greater than half the time spent was used for coordination of care and counseling of patient.    Luis Alfredo Villa M.D.  Rheumatology Department   [facility]

## 2022-10-05 ENCOUNTER — CLINICAL SUPPORT (OUTPATIENT)
Dept: REHABILITATION | Facility: HOSPITAL | Age: 27
End: 2022-10-05
Attending: STUDENT IN AN ORGANIZED HEALTH CARE EDUCATION/TRAINING PROGRAM
Payer: COMMERCIAL

## 2022-10-05 DIAGNOSIS — R29.3 POSTURE ABNORMALITY: ICD-10-CM

## 2022-10-05 DIAGNOSIS — R29.898 WEAKNESS OF RIGHT LOWER EXTREMITY: Primary | ICD-10-CM

## 2022-10-05 PROCEDURE — 97110 THERAPEUTIC EXERCISES: CPT | Mod: PO

## 2022-10-05 PROCEDURE — 97140 MANUAL THERAPY 1/> REGIONS: CPT | Mod: PO

## 2022-10-08 ENCOUNTER — PATIENT MESSAGE (OUTPATIENT)
Dept: FAMILY MEDICINE | Facility: CLINIC | Age: 27
End: 2022-10-08
Payer: COMMERCIAL

## 2022-10-12 ENCOUNTER — TELEPHONE (OUTPATIENT)
Dept: SLEEP MEDICINE | Facility: OTHER | Age: 27
End: 2022-10-12
Payer: COMMERCIAL

## 2022-10-12 ENCOUNTER — PATIENT MESSAGE (OUTPATIENT)
Dept: FAMILY MEDICINE | Facility: CLINIC | Age: 27
End: 2022-10-12
Payer: COMMERCIAL

## 2022-10-25 ENCOUNTER — PATIENT MESSAGE (OUTPATIENT)
Dept: RHEUMATOLOGY | Facility: CLINIC | Age: 27
End: 2022-10-25
Payer: COMMERCIAL

## 2022-10-25 RX ORDER — METHYLPREDNISOLONE 4 MG/1
TABLET ORAL
Qty: 1 EACH | Refills: 0 | Status: SHIPPED | OUTPATIENT
Start: 2022-10-25

## 2022-10-27 ENCOUNTER — OFFICE VISIT (OUTPATIENT)
Dept: ALLERGY | Facility: CLINIC | Age: 27
End: 2022-10-27
Payer: COMMERCIAL

## 2022-10-27 VITALS
HEIGHT: 65 IN | DIASTOLIC BLOOD PRESSURE: 86 MMHG | SYSTOLIC BLOOD PRESSURE: 106 MMHG | WEIGHT: 293 LBS | BODY MASS INDEX: 48.82 KG/M2

## 2022-10-27 DIAGNOSIS — J32.9 RECURRENT SINUS INFECTIONS: ICD-10-CM

## 2022-10-27 DIAGNOSIS — J31.0 CHRONIC RHINITIS: Primary | ICD-10-CM

## 2022-10-27 DIAGNOSIS — H10.423 SIMPLE CHRONIC CONJUNCTIVITIS OF BOTH EYES: ICD-10-CM

## 2022-10-27 DIAGNOSIS — J32.4 CHRONIC PANSINUSITIS: ICD-10-CM

## 2022-10-27 PROCEDURE — 1159F PR MEDICATION LIST DOCUMENTED IN MEDICAL RECORD: ICD-10-PCS | Mod: CPTII,S$GLB,, | Performed by: ALLERGY & IMMUNOLOGY

## 2022-10-27 PROCEDURE — 3074F PR MOST RECENT SYSTOLIC BLOOD PRESSURE < 130 MM HG: ICD-10-PCS | Mod: CPTII,S$GLB,, | Performed by: ALLERGY & IMMUNOLOGY

## 2022-10-27 PROCEDURE — 3044F PR MOST RECENT HEMOGLOBIN A1C LEVEL <7.0%: ICD-10-PCS | Mod: CPTII,S$GLB,, | Performed by: ALLERGY & IMMUNOLOGY

## 2022-10-27 PROCEDURE — 1159F MED LIST DOCD IN RCRD: CPT | Mod: CPTII,S$GLB,, | Performed by: ALLERGY & IMMUNOLOGY

## 2022-10-27 PROCEDURE — 3079F PR MOST RECENT DIASTOLIC BLOOD PRESSURE 80-89 MM HG: ICD-10-PCS | Mod: CPTII,S$GLB,, | Performed by: ALLERGY & IMMUNOLOGY

## 2022-10-27 PROCEDURE — 99203 PR OFFICE/OUTPT VISIT, NEW, LEVL III, 30-44 MIN: ICD-10-PCS | Mod: S$GLB,,, | Performed by: ALLERGY & IMMUNOLOGY

## 2022-10-27 PROCEDURE — 3074F SYST BP LT 130 MM HG: CPT | Mod: CPTII,S$GLB,, | Performed by: ALLERGY & IMMUNOLOGY

## 2022-10-27 PROCEDURE — 99999 PR PBB SHADOW E&M-EST. PATIENT-LVL IV: CPT | Mod: PBBFAC,,, | Performed by: ALLERGY & IMMUNOLOGY

## 2022-10-27 PROCEDURE — 1160F PR REVIEW ALL MEDS BY PRESCRIBER/CLIN PHARMACIST DOCUMENTED: ICD-10-PCS | Mod: CPTII,S$GLB,, | Performed by: ALLERGY & IMMUNOLOGY

## 2022-10-27 PROCEDURE — 1160F RVW MEDS BY RX/DR IN RCRD: CPT | Mod: CPTII,S$GLB,, | Performed by: ALLERGY & IMMUNOLOGY

## 2022-10-27 PROCEDURE — 3044F HG A1C LEVEL LT 7.0%: CPT | Mod: CPTII,S$GLB,, | Performed by: ALLERGY & IMMUNOLOGY

## 2022-10-27 PROCEDURE — 3079F DIAST BP 80-89 MM HG: CPT | Mod: CPTII,S$GLB,, | Performed by: ALLERGY & IMMUNOLOGY

## 2022-10-27 PROCEDURE — 99203 OFFICE O/P NEW LOW 30 MIN: CPT | Mod: S$GLB,,, | Performed by: ALLERGY & IMMUNOLOGY

## 2022-10-27 PROCEDURE — 99999 PR PBB SHADOW E&M-EST. PATIENT-LVL IV: ICD-10-PCS | Mod: PBBFAC,,, | Performed by: ALLERGY & IMMUNOLOGY

## 2022-10-27 RX ORDER — ARIPIPRAZOLE 5 MG/1
5 TABLET ORAL NIGHTLY
COMMUNITY
Start: 2022-10-20

## 2022-10-27 RX ORDER — DESVENLAFAXINE SUCCINATE 100 MG/1
100 TABLET, EXTENDED RELEASE ORAL DAILY
COMMUNITY
Start: 2022-10-20

## 2022-10-27 NOTE — PROGRESS NOTES
Subjective:       Patient ID: Saul Huff is a 26 y.o. female.    Chief Complaint:  Allergic Rhinitis  (Itchy nose, sneezing, PND, runny and stuffy nose, watery eyes) and Sinusitis (Gets 4-5 sinus infections a year.has facial pain and pressure, headaches,ears hurt, sore throat, and feels fatigue)      25 yo woman presents for consult from Dr Dulce Villalobos for sinusitis. She states she gets inus infections up to 4-6 times per year. treated with antibiotics which help but comes right back. Hash ad pneumonia in past, last in college so about 2017. No skin infections but gets frequent yeast infections and ear infections as well. For sinus she has stuffy nose, pressure, runny nose, watery eyes, itchy nose, sneeze and neck swelling. Ears get full. No chest symptoms. Is worse in fall and spring. No time of day worse. Worse with high pollen. On zyrtec daily and Flonase about 4 times per week. No H/O asthma. No eczema. No known food, insect or latex allergy. Does get gum swelling from grape cherries per pt. Had turbinate reduction surgery in 2019. Has fibromyalgia, IBS, anxiety and depression, HPV, bipolar per pt.       Environmental History: see history section for home environment  Review of Systems   Constitutional:  Negative for appetite change, chills, fatigue and fever.   HENT:  Positive for congestion, ear pain, facial swelling, postnasal drip, rhinorrhea, sinus pressure and sneezing. Negative for ear discharge, nosebleeds, sore throat, trouble swallowing and voice change.    Eyes:  Positive for discharge and itching. Negative for redness and visual disturbance.   Respiratory:  Negative for cough, choking, chest tightness, shortness of breath and wheezing.    Cardiovascular:  Negative for chest pain, palpitations and leg swelling.   Gastrointestinal:  Negative for abdominal distention, abdominal pain, constipation, diarrhea, nausea and vomiting.   Genitourinary:  Negative for difficulty urinating.    Musculoskeletal:  Negative for arthralgias, gait problem, joint swelling and myalgias.   Skin:  Negative for color change and rash.   Neurological:  Negative for dizziness, syncope, weakness, light-headedness and headaches.   Hematological:  Negative for adenopathy. Does not bruise/bleed easily.   Psychiatric/Behavioral:  Negative for agitation, behavioral problems, confusion and sleep disturbance. The patient is not nervous/anxious.       Objective:      Physical Exam  Vitals and nursing note reviewed.   Constitutional:       General: She is not in acute distress.     Appearance: Normal appearance. She is not ill-appearing.   HENT:      Head: Normocephalic and atraumatic.      Right Ear: External ear normal.      Left Ear: External ear normal.      Nose: No rhinorrhea.   Eyes:      General:         Right eye: No discharge.         Left eye: No discharge.      Conjunctiva/sclera: Conjunctivae normal.   Cardiovascular:      Rate and Rhythm: Normal rate and regular rhythm.   Pulmonary:      Effort: Pulmonary effort is normal. No respiratory distress.   Abdominal:      General: There is no distension.   Musculoskeletal:         General: Normal range of motion.      Cervical back: Normal range of motion.   Skin:     General: Skin is warm and dry.      Findings: No erythema or rash.   Neurological:      Mental Status: She is alert and oriented to person, place, and time.   Psychiatric:         Mood and Affect: Mood normal.         Behavior: Behavior normal.         Thought Content: Thought content normal.         Judgment: Judgment normal.       Laboratory:   none performed   Assessment:       1. Chronic rhinitis    2. Chronic pansinusitis    3. Simple chronic conjunctivitis of both eyes    4. Recurrent sinus infections         Plan:       Advised pt symptoms can be allergic rhinitis vs chronic non allergic rhinitis vs recurrent infections related to immune system, will send labs for immunocaps and immune  system  Continue cetirizine 10 mg daily and fluticasone 2 SEN daily  Phone review  Dr Villalobos notified of completed consult via [employer]

## 2022-11-03 NOTE — TELEPHONE ENCOUNTER
Please let pt know that I am very sorry in the delay of this message. It got lost. Please have her schedule a virtual visit so that we can discuss her concerns further.

## 2022-11-10 ENCOUNTER — TELEPHONE (OUTPATIENT)
Dept: ALLERGY | Facility: CLINIC | Age: 27
End: 2022-11-10
Payer: COMMERCIAL

## 2022-11-15 ENCOUNTER — PATIENT MESSAGE (OUTPATIENT)
Dept: ALLERGY | Facility: CLINIC | Age: 27
End: 2022-11-15
Payer: COMMERCIAL

## 2022-12-05 ENCOUNTER — PATIENT MESSAGE (OUTPATIENT)
Dept: ALLERGY | Facility: CLINIC | Age: 27
End: 2022-12-05
Payer: COMMERCIAL

## 2022-12-21 ENCOUNTER — OFFICE VISIT (OUTPATIENT)
Dept: URGENT CARE | Facility: CLINIC | Age: 27
End: 2022-12-21
Payer: COMMERCIAL

## 2022-12-21 VITALS
SYSTOLIC BLOOD PRESSURE: 133 MMHG | TEMPERATURE: 98 F | RESPIRATION RATE: 16 BRPM | HEART RATE: 101 BPM | WEIGHT: 293 LBS | DIASTOLIC BLOOD PRESSURE: 81 MMHG | OXYGEN SATURATION: 97 % | HEIGHT: 65 IN | BODY MASS INDEX: 48.82 KG/M2

## 2022-12-21 DIAGNOSIS — H61.23 EXCESSIVE EAR WAX, BILATERAL: ICD-10-CM

## 2022-12-21 DIAGNOSIS — R09.81 CONGESTION OF NASAL SINUS: Primary | ICD-10-CM

## 2022-12-21 DIAGNOSIS — J06.9 VIRAL URI WITH COUGH: ICD-10-CM

## 2022-12-21 DIAGNOSIS — R11.2 NAUSEA AND VOMITING IN ADULT: ICD-10-CM

## 2022-12-21 LAB
CTP QC/QA: YES
CTP QC/QA: YES
FLUAV AG NPH QL: NEGATIVE
FLUBV AG NPH QL: NEGATIVE
SARS-COV-2 AG RESP QL IA.RAPID: NEGATIVE

## 2022-12-21 PROCEDURE — 87804 POCT INFLUENZA A/B: ICD-10-PCS | Mod: QW,S$GLB,, | Performed by: FAMILY MEDICINE

## 2022-12-21 PROCEDURE — 1160F PR REVIEW ALL MEDS BY PRESCRIBER/CLIN PHARMACIST DOCUMENTED: ICD-10-PCS | Mod: CPTII,S$GLB,, | Performed by: FAMILY MEDICINE

## 2022-12-21 PROCEDURE — 3075F SYST BP GE 130 - 139MM HG: CPT | Mod: CPTII,S$GLB,, | Performed by: FAMILY MEDICINE

## 2022-12-21 PROCEDURE — 3044F HG A1C LEVEL LT 7.0%: CPT | Mod: CPTII,S$GLB,, | Performed by: FAMILY MEDICINE

## 2022-12-21 PROCEDURE — 87811 SARS-COV-2 COVID19 W/OPTIC: CPT | Mod: QW,S$GLB,, | Performed by: FAMILY MEDICINE

## 2022-12-21 PROCEDURE — 1160F RVW MEDS BY RX/DR IN RCRD: CPT | Mod: CPTII,S$GLB,, | Performed by: FAMILY MEDICINE

## 2022-12-21 PROCEDURE — 87811 SARS CORONAVIRUS 2 ANTIGEN POCT, MANUAL READ: ICD-10-PCS | Mod: QW,S$GLB,, | Performed by: FAMILY MEDICINE

## 2022-12-21 PROCEDURE — 3075F PR MOST RECENT SYSTOLIC BLOOD PRESS GE 130-139MM HG: ICD-10-PCS | Mod: CPTII,S$GLB,, | Performed by: FAMILY MEDICINE

## 2022-12-21 PROCEDURE — 87804 INFLUENZA ASSAY W/OPTIC: CPT | Mod: QW,S$GLB,, | Performed by: FAMILY MEDICINE

## 2022-12-21 PROCEDURE — 1159F PR MEDICATION LIST DOCUMENTED IN MEDICAL RECORD: ICD-10-PCS | Mod: CPTII,S$GLB,, | Performed by: FAMILY MEDICINE

## 2022-12-21 PROCEDURE — 3008F BODY MASS INDEX DOCD: CPT | Mod: CPTII,S$GLB,, | Performed by: FAMILY MEDICINE

## 2022-12-21 PROCEDURE — 1159F MED LIST DOCD IN RCRD: CPT | Mod: CPTII,S$GLB,, | Performed by: FAMILY MEDICINE

## 2022-12-21 PROCEDURE — 3079F PR MOST RECENT DIASTOLIC BLOOD PRESSURE 80-89 MM HG: ICD-10-PCS | Mod: CPTII,S$GLB,, | Performed by: FAMILY MEDICINE

## 2022-12-21 PROCEDURE — 3079F DIAST BP 80-89 MM HG: CPT | Mod: CPTII,S$GLB,, | Performed by: FAMILY MEDICINE

## 2022-12-21 PROCEDURE — 3008F PR BODY MASS INDEX (BMI) DOCUMENTED: ICD-10-PCS | Mod: CPTII,S$GLB,, | Performed by: FAMILY MEDICINE

## 2022-12-21 PROCEDURE — 3044F PR MOST RECENT HEMOGLOBIN A1C LEVEL <7.0%: ICD-10-PCS | Mod: CPTII,S$GLB,, | Performed by: FAMILY MEDICINE

## 2022-12-21 PROCEDURE — 99214 OFFICE O/P EST MOD 30 MIN: CPT | Mod: 25,S$GLB,, | Performed by: FAMILY MEDICINE

## 2022-12-21 PROCEDURE — 99214 PR OFFICE/OUTPT VISIT, EST, LEVL IV, 30-39 MIN: ICD-10-PCS | Mod: 25,S$GLB,, | Performed by: FAMILY MEDICINE

## 2022-12-21 RX ORDER — ONDANSETRON 4 MG/1
4 TABLET, ORALLY DISINTEGRATING ORAL EVERY 8 HOURS PRN
Qty: 30 TABLET | Refills: 0 | Status: SHIPPED | OUTPATIENT
Start: 2022-12-21

## 2022-12-21 RX ORDER — PROMETHAZINE HYDROCHLORIDE AND DEXTROMETHORPHAN HYDROBROMIDE 6.25; 15 MG/5ML; MG/5ML
5 SYRUP ORAL EVERY 4 HOURS PRN
Qty: 240 ML | Refills: 0 | Status: SHIPPED | OUTPATIENT
Start: 2022-12-21 | End: 2022-12-31

## 2022-12-21 RX ORDER — PENICILLIN V POTASSIUM 500 MG/1
TABLET, FILM COATED ORAL
COMMUNITY
Start: 2022-12-04

## 2022-12-21 RX ORDER — BENZONATATE 200 MG/1
200 CAPSULE ORAL 3 TIMES DAILY PRN
Qty: 30 CAPSULE | Refills: 0 | Status: SHIPPED | OUTPATIENT
Start: 2022-12-21 | End: 2022-12-31

## 2022-12-21 NOTE — PROGRESS NOTES
"Subjective:       Patient ID: Saul Huff is a 26 y.o. female.    Vitals:  height is 5' 5" (1.651 m) and weight is 143.8 kg (317 lb) (abnormal). Her oral temperature is 98.1 °F (36.7 °C). Her blood pressure is 133/81 and her pulse is 101. Her respiration is 16 and oxygen saturation is 97%.     Chief Complaint: Nasal Congestion (Flu Test   Took an at home Covid Test and it was negative. - Entered by patient)    Pt states she started symptoms on Monday. Pt states her symptoms have worsened. Pt states she have tried otc medications and has had mild relief.     Sinus Problem  This is a new problem. The current episode started in the past 7 days. The problem has been gradually worsening since onset. There has been no fever. Her pain is at a severity of 0/10. She is experiencing no pain. Associated symptoms include chills, congestion, coughing, diaphoresis, ear pain, headaches, sinus pressure, sneezing and a sore throat. Treatments tried: TYLENOL COLD AND SINUS, HOT TEA W/ HONEY. The treatment provided mild relief.     Constitution: Positive for chills, sweating and fatigue.   HENT:  Positive for ear pain, congestion, postnasal drip, sinus pain, sinus pressure and sore throat.    Respiratory:  Positive for cough.    Gastrointestinal:  Positive for nausea, vomiting and diarrhea.   Allergic/Immunologic: Positive for sneezing.   Neurological:  Positive for headaches.     Objective:      Vitals:    12/21/22 1444   BP: 133/81   Pulse: 101   Resp: 16   Temp: 98.1 °F (36.7 °C)   TempSrc: Oral   SpO2: 97%   Weight: (!) 143.8 kg (317 lb)   Height: 5' 5" (1.651 m)      Physical Exam   Constitutional: She is oriented to person, place, and time. She appears well-developed. She is cooperative.  Non-toxic appearance. She does not appear ill. No distress.   HENT:   Head: Normocephalic and atraumatic.   Ears:   Right Ear: Hearing, tympanic membrane, external ear and ear canal normal.   Left Ear: Hearing, tympanic membrane, external " ear and ear canal normal.      Comments: Excess wax build up but able to visualize the tympanic membrane  Nose: Congestion present. No mucosal edema, rhinorrhea or nasal deformity. No epistaxis. Right sinus exhibits no maxillary sinus tenderness and no frontal sinus tenderness. Left sinus exhibits no maxillary sinus tenderness and no frontal sinus tenderness.   Mouth/Throat: Uvula is midline and mucous membranes are normal. No trismus in the jaw. Normal dentition. No uvula swelling. Posterior oropharyngeal erythema present. No oropharyngeal exudate or posterior oropharyngeal edema.   Eyes: Conjunctivae and lids are normal. No scleral icterus.   Neck: Trachea normal and phonation normal. Neck supple. No edema present. No erythema present. No neck rigidity present.   Cardiovascular: Normal rate, regular rhythm, normal heart sounds and normal pulses.   Pulmonary/Chest: Effort normal and breath sounds normal. No respiratory distress. She has no decreased breath sounds. She has no rhonchi.   Abdominal: Normal appearance.   Musculoskeletal: Normal range of motion.         General: No deformity. Normal range of motion.   Neurological: She is alert and oriented to person, place, and time. She exhibits normal muscle tone. Coordination normal.   Skin: Skin is warm, dry, intact, not diaphoretic and not pale.   Psychiatric: Her speech is normal and behavior is normal. Judgment and thought content normal.   Nursing note and vitals reviewed.      Results for orders placed or performed in visit on 12/21/22   POCT Influenza A/B   Result Value Ref Range    Rapid Influenza A Ag Negative Negative    Rapid Influenza B Ag Negative Negative     Acceptable Yes    SARS Coronavirus 2 Antigen, POCT Manual Read   Result Value Ref Range    SARS Coronavirus 2 Antigen Negative Negative     Acceptable Yes       Assessment:       1. Congestion of nasal sinus    2. Excessive ear wax, bilateral    3. Nausea and vomiting  in adult    4. Viral URI with cough            Plan:         Congestion of nasal sinus  -     POCT Influenza A/B  -     SARS Coronavirus 2 Antigen, POCT Manual Read    2. Excessive ear wax, bilateral  -     carbamide peroxide (DEBROX) 6.5 % otic solution; Place 5 drops into both ears 2 (two) times daily.  Dispense: 18 mL; Refill: 0    3. Nausea and vomiting in adult  -     ondansetron (ZOFRAN-ODT) 4 MG TbDL; Take 1 tablet (4 mg total) by mouth every 8 (eight) hours as needed (nausea/ vomiting).  Dispense: 30 tablet; Refill: 0    4. Viral URI with cough  -     promethazine-dextromethorphan (PROMETHAZINE-DM) 6.25-15 mg/5 mL Syrp; Take 5 mLs by mouth every 4 (four) hours as needed (cough).  Dispense: 240 mL; Refill: 0  -     benzonatate (TESSALON) 200 MG capsule; Take 1 capsule (200 mg total) by mouth 3 (three) times daily as needed for Cough.  Dispense: 30 capsule; Refill: 0    Patient Instructions   Below are suggestions for symptomatic relief of your upper respiratory symptoms:              -Salt water gargles to soothe throat pain.              -Chloroseptic spray and Cepacol lozenges also help to numb throat pain.              -Warm herbal teas with honey/lemon/ginger can help soothe sore throat and hoarseness              -Nasal saline spray reduces inflammation and dryness.              -Warm face compresses to help with facial sinus pain/pressure.              -Humidifiers and steam can help with nasal dryness and congestion              -Vicks vapor rub at night for chest congestion.              -Flonase OTC or Nasacort OTC for nasal congestion and post-nasal drip. Ok to use twice daily for the first week, then reduce to once daily after symptoms have begun to improve.              -Afrin is a nasal spray that can give immediate relief of nasal congestion but you cannot use this medication for more than 3 days              -Simple foods like chicken noodle soup.              - Mucinex for congestion or  Mucinex DM for cough during the day time. Delsym helps with coughing at night. Mucinex-D if you have sinus pressure/sinus pain or chest congestion. (caution if history of high blood pressure or palpitations). You must increase your water intake when using expectorants (Mucinex).             -Zyrtec/Claritin/Allegra/Xyzal should help with allergies.  -If you DO NOT have Hypertension or any history of palpitations, it is ok to take over the counter Sudafed or Mucinex D or Allegra-D or Claritin-D or Zyrtec-D.  -If you do take one of the above, it is ok to combine that with plain over the counter Mucinex or Allegra or Claritin or Zyrtec. If, for example, you are taking Zyrtec -D, you can combine that with Mucinex, but not Mucinex-D.  If you are taking Mucinex-D, you can combine that with plain Allegra or Claritin or Zyrtec.   -If you DO have Hypertension or palpitations, it is safe to take Coricidin HBP for relief of sinus symptoms.     Seek immediate care in the emergency room in the event of severe abdominal pain, chest pain, respiratory distress, fever unresponsive to antipyretic, dehydration, loss of consciousness, seizure.

## 2022-12-21 NOTE — PATIENT INSTRUCTIONS
Below are suggestions for symptomatic relief of your upper respiratory symptoms:              -Salt water gargles to soothe throat pain.              -Chloroseptic spray and Cepacol lozenges also help to numb throat pain.              -Warm herbal teas with honey/lemon/heaven can help soothe sore throat and hoarseness              -Nasal saline spray reduces inflammation and dryness.              -Warm face compresses to help with facial sinus pain/pressure.              -Humidifiers and steam can help with nasal dryness and congestion              -Vicks vapor rub at night for chest congestion.              -Flonase OTC or Nasacort OTC for nasal congestion and post-nasal drip. Ok to use twice daily for the first week, then reduce to once daily after symptoms have begun to improve.              -Afrin is a nasal spray that can give immediate relief of nasal congestion but you cannot use this medication for more than 3 days              -Simple foods like chicken noodle soup.              - Mucinex for congestion or Mucinex DM for cough during the day time. Delsym helps with coughing at night. Mucinex-D if you have sinus pressure/sinus pain or chest congestion. (caution if history of high blood pressure or palpitations). You must increase your water intake when using expectorants (Mucinex).             -Zyrtec/Claritin/Allegra/Xyzal should help with allergies.  -If you DO NOT have Hypertension or any history of palpitations, it is ok to take over the counter Sudafed or Mucinex D or Allegra-D or Claritin-D or Zyrtec-D.  -If you do take one of the above, it is ok to combine that with plain over the counter Mucinex or Allegra or Claritin or Zyrtec. If, for example, you are taking Zyrtec -D, you can combine that with Mucinex, but not Mucinex-D.  If you are taking Mucinex-D, you can combine that with plain Allegra or Claritin or Zyrtec.   -If you DO have Hypertension or palpitations, it is safe to take Coricidin HBP for  relief of sinus symptoms.     Seek immediate care in the emergency room in the event of severe abdominal pain, chest pain, respiratory distress, fever unresponsive to antipyretic, dehydration, loss of consciousness, seizure.

## 2022-12-28 ENCOUNTER — LAB VISIT (OUTPATIENT)
Dept: LAB | Facility: HOSPITAL | Age: 27
End: 2022-12-28
Attending: ALLERGY & IMMUNOLOGY
Payer: COMMERCIAL

## 2022-12-28 ENCOUNTER — OFFICE VISIT (OUTPATIENT)
Dept: FAMILY MEDICINE | Facility: CLINIC | Age: 27
End: 2022-12-28
Payer: COMMERCIAL

## 2022-12-28 ENCOUNTER — PATIENT MESSAGE (OUTPATIENT)
Dept: ALLERGY | Facility: CLINIC | Age: 27
End: 2022-12-28
Payer: COMMERCIAL

## 2022-12-28 VITALS
HEIGHT: 65 IN | SYSTOLIC BLOOD PRESSURE: 110 MMHG | OXYGEN SATURATION: 99 % | DIASTOLIC BLOOD PRESSURE: 58 MMHG | HEART RATE: 127 BPM | BODY MASS INDEX: 48.82 KG/M2 | TEMPERATURE: 99 F | WEIGHT: 293 LBS

## 2022-12-28 DIAGNOSIS — J32.9 RECURRENT SINUS INFECTIONS: ICD-10-CM

## 2022-12-28 DIAGNOSIS — R53.83 FATIGUE, UNSPECIFIED TYPE: ICD-10-CM

## 2022-12-28 DIAGNOSIS — J31.0 CHRONIC RHINITIS: ICD-10-CM

## 2022-12-28 DIAGNOSIS — E66.01 CLASS 3 SEVERE OBESITY DUE TO EXCESS CALORIES WITH SERIOUS COMORBIDITY AND BODY MASS INDEX (BMI) OF 50.0 TO 59.9 IN ADULT: ICD-10-CM

## 2022-12-28 DIAGNOSIS — J32.4 CHRONIC PANSINUSITIS: ICD-10-CM

## 2022-12-28 DIAGNOSIS — H10.423 SIMPLE CHRONIC CONJUNCTIVITIS OF BOTH EYES: ICD-10-CM

## 2022-12-28 DIAGNOSIS — K45.8 OTHER SPECIFIED ABDOMINAL HERNIA WITHOUT OBSTRUCTION OR GANGRENE: ICD-10-CM

## 2022-12-28 DIAGNOSIS — R10.31 RIGHT LOWER QUADRANT ABDOMINAL PAIN: ICD-10-CM

## 2022-12-28 DIAGNOSIS — R41.3 OTHER AMNESIA: ICD-10-CM

## 2022-12-28 DIAGNOSIS — R41.3 MEMORY LOSS: Primary | ICD-10-CM

## 2022-12-28 LAB
IGA SERPL-MCNC: 369 MG/DL (ref 40–350)
IGE SERPL-ACNC: 103 IU/ML (ref 0–100)
IGG SERPL-MCNC: 1282 MG/DL (ref 650–1600)
IGM SERPL-MCNC: 153 MG/DL (ref 50–300)

## 2022-12-28 PROCEDURE — 3008F PR BODY MASS INDEX (BMI) DOCUMENTED: ICD-10-PCS | Mod: CPTII,S$GLB,, | Performed by: STUDENT IN AN ORGANIZED HEALTH CARE EDUCATION/TRAINING PROGRAM

## 2022-12-28 PROCEDURE — 82785 ASSAY OF IGE: CPT | Performed by: ALLERGY & IMMUNOLOGY

## 2022-12-28 PROCEDURE — 3078F DIAST BP <80 MM HG: CPT | Mod: CPTII,S$GLB,, | Performed by: STUDENT IN AN ORGANIZED HEALTH CARE EDUCATION/TRAINING PROGRAM

## 2022-12-28 PROCEDURE — 99999 PR PBB SHADOW E&M-EST. PATIENT-LVL V: ICD-10-PCS | Mod: PBBFAC,,, | Performed by: STUDENT IN AN ORGANIZED HEALTH CARE EDUCATION/TRAINING PROGRAM

## 2022-12-28 PROCEDURE — 3078F PR MOST RECENT DIASTOLIC BLOOD PRESSURE < 80 MM HG: ICD-10-PCS | Mod: CPTII,S$GLB,, | Performed by: STUDENT IN AN ORGANIZED HEALTH CARE EDUCATION/TRAINING PROGRAM

## 2022-12-28 PROCEDURE — 3008F BODY MASS INDEX DOCD: CPT | Mod: CPTII,S$GLB,, | Performed by: STUDENT IN AN ORGANIZED HEALTH CARE EDUCATION/TRAINING PROGRAM

## 2022-12-28 PROCEDURE — 82787 IGG 1 2 3 OR 4 EACH: CPT | Mod: 59 | Performed by: ALLERGY & IMMUNOLOGY

## 2022-12-28 PROCEDURE — 99214 OFFICE O/P EST MOD 30 MIN: CPT | Mod: S$GLB,,, | Performed by: STUDENT IN AN ORGANIZED HEALTH CARE EDUCATION/TRAINING PROGRAM

## 2022-12-28 PROCEDURE — 82784 ASSAY IGA/IGD/IGG/IGM EACH: CPT | Mod: 59 | Performed by: ALLERGY & IMMUNOLOGY

## 2022-12-28 PROCEDURE — 1159F PR MEDICATION LIST DOCUMENTED IN MEDICAL RECORD: ICD-10-PCS | Mod: CPTII,S$GLB,, | Performed by: STUDENT IN AN ORGANIZED HEALTH CARE EDUCATION/TRAINING PROGRAM

## 2022-12-28 PROCEDURE — 3044F HG A1C LEVEL LT 7.0%: CPT | Mod: CPTII,S$GLB,, | Performed by: STUDENT IN AN ORGANIZED HEALTH CARE EDUCATION/TRAINING PROGRAM

## 2022-12-28 PROCEDURE — 1159F MED LIST DOCD IN RCRD: CPT | Mod: CPTII,S$GLB,, | Performed by: STUDENT IN AN ORGANIZED HEALTH CARE EDUCATION/TRAINING PROGRAM

## 2022-12-28 PROCEDURE — 3074F SYST BP LT 130 MM HG: CPT | Mod: CPTII,S$GLB,, | Performed by: STUDENT IN AN ORGANIZED HEALTH CARE EDUCATION/TRAINING PROGRAM

## 2022-12-28 PROCEDURE — 3074F PR MOST RECENT SYSTOLIC BLOOD PRESSURE < 130 MM HG: ICD-10-PCS | Mod: CPTII,S$GLB,, | Performed by: STUDENT IN AN ORGANIZED HEALTH CARE EDUCATION/TRAINING PROGRAM

## 2022-12-28 PROCEDURE — 36415 COLL VENOUS BLD VENIPUNCTURE: CPT | Mod: PO | Performed by: ALLERGY & IMMUNOLOGY

## 2022-12-28 PROCEDURE — 99214 PR OFFICE/OUTPT VISIT, EST, LEVL IV, 30-39 MIN: ICD-10-PCS | Mod: S$GLB,,, | Performed by: STUDENT IN AN ORGANIZED HEALTH CARE EDUCATION/TRAINING PROGRAM

## 2022-12-28 PROCEDURE — 99999 PR PBB SHADOW E&M-EST. PATIENT-LVL V: CPT | Mod: PBBFAC,,, | Performed by: STUDENT IN AN ORGANIZED HEALTH CARE EDUCATION/TRAINING PROGRAM

## 2022-12-28 PROCEDURE — 3044F PR MOST RECENT HEMOGLOBIN A1C LEVEL <7.0%: ICD-10-PCS | Mod: CPTII,S$GLB,, | Performed by: STUDENT IN AN ORGANIZED HEALTH CARE EDUCATION/TRAINING PROGRAM

## 2022-12-28 PROCEDURE — 82784 ASSAY IGA/IGD/IGG/IGM EACH: CPT | Performed by: ALLERGY & IMMUNOLOGY

## 2022-12-28 PROCEDURE — 86003 ALLG SPEC IGE CRUDE XTRC EA: CPT | Performed by: ALLERGY & IMMUNOLOGY

## 2022-12-28 PROCEDURE — 86003 ALLG SPEC IGE CRUDE XTRC EA: CPT | Mod: 59 | Performed by: ALLERGY & IMMUNOLOGY

## 2022-12-28 NOTE — PROGRESS NOTES
12/28/2022    Saul Huff  [id number]    Chief Complaint   Patient presents with    HPV Follow Up       HPI    Ms. Huff presents to discuss HPV results and brain fog.    Patient states she hd HPV in Sept and unsure of the next steps.     Brain fog  Has been present for the last several months and seems to be getting worse. Denies any life changes but endorses change in mood medication regimen. Pt also using medical marijuana. Pt states that this is effecting her work. She often forgets important dates, keys, losing things.     Recent URI/ abnormal vitals  Recently finished 1 mo course of abx for tooth infection; now recovering from URI that started last week        Negative 10 point ROS outside of HPI    Social History     Socioeconomic History    Marital status: Single   Tobacco Use    Smoking status: Every Day     Packs/day: 0.50     Types: Cigarettes    Smokeless tobacco: Never   Substance and Sexual Activity    Alcohol use: No    Drug use: No    Sexual activity: Not Currently           Current Outpatient Medications:     ABILIFY 5 mg Tab, Take 5 mg by mouth every evening., Disp: , Rfl:     amitriptyline (ELAVIL) 50 MG tablet, Take 1 tablet (50 mg total) by mouth every evening., Disp: 30 tablet, Rfl: 11    carbamide peroxide (DEBROX) 6.5 % otic solution, Place 5 drops into both ears 2 (two) times daily., Disp: 18 mL, Rfl: 0    cetirizine (ZYRTEC) 10 MG tablet, Take 10 mg by mouth., Disp: , Rfl:     cyclobenzaprine (FLEXERIL) 10 MG tablet, Take 1 tablet (10 mg total) by mouth 3 (three) times daily as needed for Muscle spasms., Disp: 90 tablet, Rfl: 11    fluticasone propionate (FLONASE) 50 mcg/actuation nasal spray, 1 spray by Nasal route., Disp: , Rfl:     KORY FE 1.5/30, 28, 1.5 mg-30 mcg (21)/75 mg (7) tablet, Take 1 tablet by mouth once daily., Disp: 90 tablet, Rfl: 4    hydrOXYzine pamoate (VISTARIL) 25 MG Cap, TAKE 1 CAPSULE BY MOUTH EVERY DAY AS NEEDED FOR ANXIETY, Disp: , Rfl:     LAMICTAL 25 mg  tablet, Take 150 mg by mouth once daily., Disp: , Rfl:     ondansetron (ZOFRAN-ODT) 4 MG TbDL, Take 1 tablet (4 mg total) by mouth every 8 (eight) hours as needed (nausea/ vomiting)., Disp: 30 tablet, Rfl: 0    polyethylene glycol (GLYCOLAX) 17 gram/dose powder, Take by mouth., Disp: , Rfl:     pregabalin (LYRICA) 100 MG capsule, Take 1 capsule (100 mg total) by mouth 2 (two) times daily., Disp: 60 capsule, Rfl: 6    PRISTIQ 100 mg Tb24, Take 100 mg by mouth once daily., Disp: , Rfl:     urea 20 % Crea, Apply 1 application topically once daily. To dry skin on the feet., Disp: 75 g, Rfl: 10    benzonatate (TESSALON) 200 MG capsule, Take 1 capsule (200 mg total) by mouth 3 (three) times daily as needed for Cough., Disp: 30 capsule, Rfl: 0    methylPREDNISolone (MEDROL DOSEPACK) 4 mg tablet, use as directed (Patient not taking: Reported on 12/21/2022), Disp: 1 each, Rfl: 0    penicillin v potassium (VEETID) 500 MG tablet, Take by mouth., Disp: , Rfl:     promethazine-dextromethorphan (PROMETHAZINE-DM) 6.25-15 mg/5 mL Syrp, Take 5 mLs by mouth every 4 (four) hours as needed (cough). (Patient not taking: Reported on 12/28/2022), Disp: 240 mL, Rfl: 0      Physical Exam  Vitals:    12/28/22 1357   BP: (!) 110/58   Pulse: (!) 127   Temp: 99.1 °F (37.3 °C)     Physical Exam  Abdominal:             Gen: well appearing, NAD  Resp: non labored breathing, no crackles, no wheezes, CTAB  CV: RRR no murmur, gallops, rubs, no LE edema  Abd: soft highlighted area with abd wall defect tender to palpation BS present no organomegaly    1. Memory loss  Will get MRI since worsening. Discussed ddx for memory loss including polypharmacy and mood  - VITAMIN B12; Future    2. Class 3 severe obesity due to excess calories with serious comorbidity and body mass index (BMI) of 50.0 to 59.9 in adult  - Ambulatory referral/consult to Bariatric Surgery; Future    3. Other amnesia  - MRI Brain Without Contrast; Future    4. Fatigue, unspecified  type  - Home Sleep Study; Future    5. Other specified abdominal hernia without obstruction or gangrene  - CT Abdomen Pelvis  Without Contrast; Future    6. Right lower quadrant abdominal pain  - CT Abdomen Pelvis  Without Contrast; Future    RTC pending labs/results     Dulce Villalobos MD  Family Medicine

## 2023-01-03 LAB
A ALTERNATA IGE QN: <0.1 KU/L
A FUMIGATUS IGE QN: <0.1 KU/L
ALLERGEN CHAETOMIUM GLOBOSUM IGE: <0.1 KU/L
ALLERGEN WALNUT TREE IGE: 1.05 KU/L
BAHIA GRASS IGE QN: 0.77 KU/L
BERMUDA GRASS IGE QN: 0.71 KU/L
C LUNATA IGE QN: <0.1 KU/L
CAT DANDER IGE QN: 3.56 KU/L
CHAETOMIUM GLOB. CLASS: NORMAL
COMMON RAGWEED IGE QN: 1.53 KU/L
COTTONWOOD IGE QN: 0.92 KU/L
D FARINAE IGE QN: 0.3 KU/L
DEPRECATED A ALTERNATA IGE RAST QL: NORMAL
DEPRECATED A FUMIGATUS IGE RAST QL: NORMAL
DEPRECATED BAHIA GRASS IGE RAST QL: ABNORMAL
DEPRECATED BERMUDA GRASS IGE RAST QL: ABNORMAL
DEPRECATED C LUNATA IGE RAST QL: NORMAL
DEPRECATED CAT DANDER IGE RAST QL: ABNORMAL
DEPRECATED COMMON RAGWEED IGE RAST QL: ABNORMAL
DEPRECATED COTTONWOOD IGE RAST QL: ABNORMAL
DEPRECATED D FARINAE IGE RAST QL: ABNORMAL
DEPRECATED DOG DANDER IGE RAST QL: ABNORMAL
DEPRECATED ELDER IGE RAST QL: ABNORMAL
DEPRECATED ENGL PLANTAIN IGE RAST QL: ABNORMAL
DEPRECATED LONDON PLANE IGE RAST QL: ABNORMAL
DEPRECATED MUGWORT IGE RAST QL: ABNORMAL
DEPRECATED P NOTATUM IGE RAST QL: NORMAL
DEPRECATED PECAN/HICK TREE IGE RAST QL: ABNORMAL
DEPRECATED ROACH IGE RAST QL: ABNORMAL
DEPRECATED S ROSTRATA IGE RAST QL: NORMAL
DEPRECATED SALTWORT IGE RAST QL: ABNORMAL
DEPRECATED SILVER BIRCH IGE RAST QL: ABNORMAL
DEPRECATED TIMOTHY IGE RAST QL: ABNORMAL
DEPRECATED WEST RAGWEED IGE RAST QL: ABNORMAL
DEPRECATED WHITE OAK IGE RAST QL: ABNORMAL
DEPRECATED WILLOW IGE RAST QL: ABNORMAL
DOG DANDER IGE QN: 0.22 KU/L
ELDER IGE QN: 0.69 KU/L
ENGL PLANTAIN IGE QN: 0.65 KU/L
IGG1 SER-MCNC: 873 MG/DL (ref 382–929)
IGG2 SER-MCNC: 196 MG/DL (ref 242–700)
IGG3 SER-MCNC: 228 MG/DL (ref 22–176)
IGG4 SER-MCNC: 47 MG/DL (ref 4–86)
LONDON PLANE IGE QN: 0.8 KU/L
MUGWORT IGE QN: 0.55 KU/L
P NOTATUM IGE QN: <0.1 KU/L
PECAN/HICK TREE IGE QN: 0.99 KU/L
ROACH IGE QN: 0.5 KU/L
S ROSTRATA IGE QN: <0.1 KU/L
SALTWORT IGE QN: 0.83 KU/L
SILVER BIRCH IGE QN: 7.04 KU/L
TIMOTHY IGE QN: 0.68 KU/L
WALNUT TREE CLASS: ABNORMAL
WEST RAGWEED IGE QN: 1.28 KU/L
WHITE OAK IGE QN: 12.2 KU/L
WILLOW IGE QN: 0.89 KU/L

## 2023-01-05 ENCOUNTER — PATIENT MESSAGE (OUTPATIENT)
Dept: PODIATRY | Facility: CLINIC | Age: 28
End: 2023-01-05
Payer: COMMERCIAL

## 2023-01-05 NOTE — TELEPHONE ENCOUNTER
----- Message from Dulce Villalobos MD sent at 7/5/2022  7:33 AM CDT -----  Please let patient know that her labs were ok. Her CBC does have a slightly elevated white blood cell count, which can be seen with any viral or bacterial infection. We can repeat this is a few weeks to see that it resolves.   Pt. p/w 2 weeks worsening dyspnea on exertion, PND and orthopnea x 2 weeks. Pro-BNP 5600 on admission w/ CTA showing moderate loculated R pleural effusion and pt. w/ +entero/rhinovirus on RVP. Presentation most likely 2/2 acute on chronic CHF exacerbation i/s/o entero/rhinovirus vs. pleural effusion   - TTE (11/2022): EF 22% w/ severe LV systolic dysfunction, decreased RV systolic function, and severe pHTN  - TTE (1/4/2023): EF 27% w/ eccentric LVH, severe LVSD, akinesis of apical, septal, inferior walls and hypokinesis of remaining walls, severe LAE w/ dec RVSF  - HF consulted; appreciate recs  - c/w IV Lasix 40mg BID for continued diuresis, will reassess volume status and switch to daily dosing  - c/w Aldactone 12.5mg qd  - c/w metoprolol succinate 50mg qd  - c/w Entresto 24-26mg qd  - holding Farxiga for now  - strict I/Os, daily standing weights  - monitor electrolytes and replete for K>4 and Mg>2 Pt. p/w 2 weeks worsening dyspnea on exertion, PND and orthopnea x 2 weeks. Pro-BNP 5600 on admission w/ CTA showing moderate loculated R pleural effusion and pt. w/ +entero/rhinovirus on RVP. Presentation most likely 2/2 acute on chronic CHF exacerbation i/s/o entero/rhinovirus vs. pleural effusion   - TTE (11/2022): EF 22% w/ severe LV systolic dysfunction, decreased RV systolic function, and severe pHTN  - TTE (1/4/2023): EF 27% w/ eccentric LVH, severe LVSD, akinesis of apical, septal, inferior walls and hypokinesis of remaining walls, severe LAE w/ dec RVSF  - HF following; appreciate recs  - decreased IV Lasix to 40mg qd for continued diuresis  - c/w Aldactone 12.5mg qd  - c/w metoprolol succinate 50mg qd  - c/w Entresto 24-26mg qd  - holding Farxiga for now  - strict I/Os, daily standing weights  - monitor electrolytes and replete for K>4 and Mg>2

## 2023-03-07 ENCOUNTER — OFFICE VISIT (OUTPATIENT)
Dept: OBSTETRICS AND GYNECOLOGY | Facility: CLINIC | Age: 28
End: 2023-03-07
Payer: COMMERCIAL

## 2023-03-07 VITALS — WEIGHT: 293 LBS | SYSTOLIC BLOOD PRESSURE: 117 MMHG | DIASTOLIC BLOOD PRESSURE: 74 MMHG | BODY MASS INDEX: 57.85 KG/M2

## 2023-03-07 DIAGNOSIS — Z70.8 HUMAN PAPILLOMA VIRUS (HPV) COUNSELING: ICD-10-CM

## 2023-03-07 DIAGNOSIS — N92.6 MENSES, IRREGULAR: ICD-10-CM

## 2023-03-07 DIAGNOSIS — Z30.41 ENCOUNTER FOR SURVEILLANCE OF CONTRACEPTIVE PILLS: Primary | ICD-10-CM

## 2023-03-07 PROCEDURE — 1159F MED LIST DOCD IN RCRD: CPT | Mod: CPTII,S$GLB,, | Performed by: OBSTETRICS & GYNECOLOGY

## 2023-03-07 PROCEDURE — 3008F PR BODY MASS INDEX (BMI) DOCUMENTED: ICD-10-PCS | Mod: CPTII,S$GLB,, | Performed by: OBSTETRICS & GYNECOLOGY

## 2023-03-07 PROCEDURE — 3008F BODY MASS INDEX DOCD: CPT | Mod: CPTII,S$GLB,, | Performed by: OBSTETRICS & GYNECOLOGY

## 2023-03-07 PROCEDURE — 99213 OFFICE O/P EST LOW 20 MIN: CPT | Mod: S$GLB,,, | Performed by: OBSTETRICS & GYNECOLOGY

## 2023-03-07 PROCEDURE — 3074F SYST BP LT 130 MM HG: CPT | Mod: CPTII,S$GLB,, | Performed by: OBSTETRICS & GYNECOLOGY

## 2023-03-07 PROCEDURE — 99999 PR PBB SHADOW E&M-EST. PATIENT-LVL III: CPT | Mod: PBBFAC,,, | Performed by: OBSTETRICS & GYNECOLOGY

## 2023-03-07 PROCEDURE — 3078F PR MOST RECENT DIASTOLIC BLOOD PRESSURE < 80 MM HG: ICD-10-PCS | Mod: CPTII,S$GLB,, | Performed by: OBSTETRICS & GYNECOLOGY

## 2023-03-07 PROCEDURE — 1160F PR REVIEW ALL MEDS BY PRESCRIBER/CLIN PHARMACIST DOCUMENTED: ICD-10-PCS | Mod: CPTII,S$GLB,, | Performed by: OBSTETRICS & GYNECOLOGY

## 2023-03-07 PROCEDURE — 3078F DIAST BP <80 MM HG: CPT | Mod: CPTII,S$GLB,, | Performed by: OBSTETRICS & GYNECOLOGY

## 2023-03-07 PROCEDURE — 1159F PR MEDICATION LIST DOCUMENTED IN MEDICAL RECORD: ICD-10-PCS | Mod: CPTII,S$GLB,, | Performed by: OBSTETRICS & GYNECOLOGY

## 2023-03-07 PROCEDURE — 1160F RVW MEDS BY RX/DR IN RCRD: CPT | Mod: CPTII,S$GLB,, | Performed by: OBSTETRICS & GYNECOLOGY

## 2023-03-07 PROCEDURE — 99999 PR PBB SHADOW E&M-EST. PATIENT-LVL III: ICD-10-PCS | Mod: PBBFAC,,, | Performed by: OBSTETRICS & GYNECOLOGY

## 2023-03-07 PROCEDURE — 3074F PR MOST RECENT SYSTOLIC BLOOD PRESSURE < 130 MM HG: ICD-10-PCS | Mod: CPTII,S$GLB,, | Performed by: OBSTETRICS & GYNECOLOGY

## 2023-03-07 PROCEDURE — 99213 PR OFFICE/OUTPT VISIT, EST, LEVL III, 20-29 MIN: ICD-10-PCS | Mod: S$GLB,,, | Performed by: OBSTETRICS & GYNECOLOGY

## 2023-03-07 NOTE — PROGRESS NOTES
CC:  Chief Complaint   Patient presents with    Well Woman       HPI:    27 y.o.   OB History          0    Para   0    Term   0       0    AB   0    Living   0         SAB   0    IAB   0    Ectopic   0    Multiple   0    Live Births                   Complaining of: has ascus + non 16/18 HPV in  thought was due for a pap but isn't until   Has been under stress with work and fly and last cycle was irreg even with ocp's    (Not in a hospital admission)      Review of patient's allergies indicates:  No Known Allergies     Past Medical History:   Diagnosis Date    Allergy     Anxiety     Fibromyalgia     Recurrent upper respiratory infection (URI)      Past Surgical History:   Procedure Laterality Date    SINUS SURGERY       Family History   Problem Relation Age of Onset    Hypertension Mother         sleep apnea    Diabetes Father         schizophrenia, bipolar, hypertension, chf    Lupus Sister         MS    Uterine cancer Maternal Grandmother     Alcohol abuse Maternal Grandfather     Ovarian cancer Paternal Grandmother     Schizophrenia Paternal Grandfather     Breast cancer Neg Hx     Colon cancer Neg Hx     Allergic rhinitis Neg Hx     Allergies Neg Hx     Angioedema Neg Hx     Asthma Neg Hx     Atopy Neg Hx     Eczema Neg Hx     Immunodeficiency Neg Hx     Rhinitis Neg Hx     Urticaria Neg Hx      Social History     Tobacco Use    Smoking status: Every Day     Packs/day: 0.50     Types: Cigarettes    Smokeless tobacco: Never   Substance Use Topics    Alcohol use: No    Drug use: No     ROS:  GENERAL: Feeling well overall. Denies fever or chills.   SKIN: Denies rash or lesions.   HEAD: Denies head injury or headache.   NODES: Denies enlarged lymph nodes.   CHEST: Denies chest pain or shortness of breath.   CARDIOVASCULAR: Denies palpitations or left sided chest pain.    ABDOMEN: Denies diarrhea, nausea, vomiting or rectal bleeding.   URINARY: No dysuria, hematuria, or burning on  urination.  REPRODUCTIVE: See HPI.   BREASTS: Denies pain, lumps, or nipple discharge.   HEMATOLOGIC: No easy bruisability or excessive bleeding.   MUSCULOSKELETAL: Denies joint pain or swelling.   NEUROLOGIC: Denies syncope or weakness.   PSYCHIATRIC: Denies depression, anxiety or mood swings.      PE: /74   Wt (!) 157.7 kg (347 lb 10.7 oz)   LMP  (LMP Unknown)   BMI 57.85 kg/m²      APPEARANCE: Well nourished, well developed, in no acute distress.  SKIN: Normal skin turgor, no lesions.  NECK: Neck symmetric without masses or thyromegaly.  NODES: No inguinal, cervical, axillary or femoral lymph node enlargement.  CARDIOVASCULAR: Normal S1, S2. No rubs, murmurs or gallops.  NEUROLOGIC: Normal mood and affect. No depression or anxiety.   ABDOMEN: Soft. No tenderness or masses. No hepatosplenomegaly. No hernias.    ASSESSMENT/ PLAN    Saul was seen today for well woman.    Diagnoses and all orders for this visit:    Encounter for surveillance of contraceptive pills    Human papilloma virus (HPV) counseling      Counseling session about most recent pap smear was normal, but HPV testing was +. As you know, the pap smear is a screening test for cervical cancer.  Your test showed you did NOT have an HPV type that puts you at immediate risk of severe dysplasia or cancer and therefore does NOT warrant any further testing. All we should is another pap and rpt HPV test in 1yr.     Counseled about stress redxn and monitoring cycles  Wt loss  also is important    MD Jude Hughes MD

## 2023-06-13 ENCOUNTER — PATIENT MESSAGE (OUTPATIENT)
Dept: RESEARCH | Facility: HOSPITAL | Age: 28
End: 2023-06-13
Payer: COMMERCIAL

## 2023-06-20 ENCOUNTER — PATIENT MESSAGE (OUTPATIENT)
Dept: RESEARCH | Facility: HOSPITAL | Age: 28
End: 2023-06-20
Payer: COMMERCIAL

## 2023-06-27 ENCOUNTER — PATIENT MESSAGE (OUTPATIENT)
Dept: RESEARCH | Facility: HOSPITAL | Age: 28
End: 2023-06-27
Payer: COMMERCIAL

## 2023-07-05 ENCOUNTER — PATIENT MESSAGE (OUTPATIENT)
Dept: RESEARCH | Facility: HOSPITAL | Age: 28
End: 2023-07-05
Payer: COMMERCIAL

## 2023-07-11 ENCOUNTER — PATIENT MESSAGE (OUTPATIENT)
Dept: RESEARCH | Facility: HOSPITAL | Age: 28
End: 2023-07-11
Payer: COMMERCIAL

## 2023-11-22 RX ORDER — NORETHINDRONE ACETATE AND ETHINYL ESTRADIOL AND FERROUS FUMARATE 1.5-30(21)
1 KIT ORAL DAILY
Qty: 90 TABLET | Refills: 3 | Status: SHIPPED | OUTPATIENT
Start: 2023-11-22 | End: 2024-11-16

## 2023-11-22 NOTE — TELEPHONE ENCOUNTER
Refill Routing Note   Medication(s) are not appropriate for processing by [facility] for the following reason(s):        Outside of protocol: active smoker    [facility] action(s):  Route      Medication Therapy Plan:         Appointments  past 12m or future 3m with PCP    Date Provider   Last Visit   3/7/2023 Jude Saab MD   Next Visit   Visit date not found Jude Saab MD   ED visits in past 90 days: 0        Note composed:10:54 AM 11/22/2023

## 2023-12-01 ENCOUNTER — OFFICE VISIT (OUTPATIENT)
Dept: PODIATRY | Facility: CLINIC | Age: 28
End: 2023-12-01
Payer: COMMERCIAL

## 2023-12-01 VITALS
DIASTOLIC BLOOD PRESSURE: 80 MMHG | HEIGHT: 65 IN | SYSTOLIC BLOOD PRESSURE: 138 MMHG | WEIGHT: 293 LBS | BODY MASS INDEX: 48.82 KG/M2 | HEART RATE: 116 BPM

## 2023-12-01 DIAGNOSIS — M79.672 BILATERAL FOOT PAIN: ICD-10-CM

## 2023-12-01 DIAGNOSIS — L85.1 PLANTAR POROKERATOSIS, ACQUIRED: Primary | Chronic | ICD-10-CM

## 2023-12-01 DIAGNOSIS — M79.671 BILATERAL FOOT PAIN: ICD-10-CM

## 2023-12-01 PROCEDURE — 3079F DIAST BP 80-89 MM HG: CPT | Mod: CPTII,S$GLB,, | Performed by: PODIATRIST

## 2023-12-01 PROCEDURE — 99999 PR PBB SHADOW E&M-EST. PATIENT-LVL V: CPT | Mod: PBBFAC,,, | Performed by: PODIATRIST

## 2023-12-01 PROCEDURE — 3079F PR MOST RECENT DIASTOLIC BLOOD PRESSURE 80-89 MM HG: ICD-10-PCS | Mod: CPTII,S$GLB,, | Performed by: PODIATRIST

## 2023-12-01 PROCEDURE — 99213 PR OFFICE/OUTPT VISIT, EST, LEVL III, 20-29 MIN: ICD-10-PCS | Mod: S$GLB,,, | Performed by: PODIATRIST

## 2023-12-01 PROCEDURE — 3008F PR BODY MASS INDEX (BMI) DOCUMENTED: ICD-10-PCS | Mod: CPTII,S$GLB,, | Performed by: PODIATRIST

## 2023-12-01 PROCEDURE — 1159F PR MEDICATION LIST DOCUMENTED IN MEDICAL RECORD: ICD-10-PCS | Mod: CPTII,S$GLB,, | Performed by: PODIATRIST

## 2023-12-01 PROCEDURE — 3008F BODY MASS INDEX DOCD: CPT | Mod: CPTII,S$GLB,, | Performed by: PODIATRIST

## 2023-12-01 PROCEDURE — 99999 PR PBB SHADOW E&M-EST. PATIENT-LVL V: ICD-10-PCS | Mod: PBBFAC,,, | Performed by: PODIATRIST

## 2023-12-01 PROCEDURE — 3075F SYST BP GE 130 - 139MM HG: CPT | Mod: CPTII,S$GLB,, | Performed by: PODIATRIST

## 2023-12-01 PROCEDURE — 3075F PR MOST RECENT SYSTOLIC BLOOD PRESS GE 130-139MM HG: ICD-10-PCS | Mod: CPTII,S$GLB,, | Performed by: PODIATRIST

## 2023-12-01 PROCEDURE — 1159F MED LIST DOCD IN RCRD: CPT | Mod: CPTII,S$GLB,, | Performed by: PODIATRIST

## 2023-12-01 PROCEDURE — 99213 OFFICE O/P EST LOW 20 MIN: CPT | Mod: S$GLB,,, | Performed by: PODIATRIST

## 2023-12-01 PROCEDURE — 1160F RVW MEDS BY RX/DR IN RCRD: CPT | Mod: CPTII,S$GLB,, | Performed by: PODIATRIST

## 2023-12-01 PROCEDURE — 1160F PR REVIEW ALL MEDS BY PRESCRIBER/CLIN PHARMACIST DOCUMENTED: ICD-10-PCS | Mod: CPTII,S$GLB,, | Performed by: PODIATRIST

## 2023-12-01 RX ORDER — IBUPROFEN 800 MG/1
800 TABLET ORAL EVERY 6 HOURS PRN
COMMUNITY
Start: 2023-03-01

## 2023-12-01 RX ORDER — TIRZEPATIDE 2.5 MG/.5ML
INJECTION, SOLUTION SUBCUTANEOUS
COMMUNITY
Start: 2023-11-30 | End: 2024-02-28

## 2023-12-01 RX ORDER — AZELASTINE 1 MG/ML
1 SPRAY, METERED NASAL 2 TIMES DAILY
COMMUNITY
Start: 2023-10-12

## 2023-12-01 RX ORDER — PROMETHAZINE HYDROCHLORIDE AND DEXTROMETHORPHAN HYDROBROMIDE 6.25; 15 MG/5ML; MG/5ML
5 SYRUP ORAL 4 TIMES DAILY PRN
COMMUNITY
Start: 2023-06-15

## 2023-12-01 RX ORDER — MONTELUKAST SODIUM 10 MG/1
1 TABLET ORAL NIGHTLY
COMMUNITY
Start: 2023-06-15 | End: 2024-06-14

## 2023-12-01 RX ORDER — DEXTROAMPHETAMINE SACCHARATE, AMPHETAMINE ASPARTATE MONOHYDRATE, DEXTROAMPHETAMINE SULFATE AND AMPHETAMINE SULFATE 2.5; 2.5; 2.5; 2.5 MG/1; MG/1; MG/1; MG/1
CAPSULE, EXTENDED RELEASE ORAL
COMMUNITY
Start: 2023-11-22

## 2023-12-01 RX ORDER — HYDROXYZINE HYDROCHLORIDE 50 MG/1
TABLET, FILM COATED ORAL
COMMUNITY

## 2023-12-01 RX ORDER — DICYCLOMINE HYDROCHLORIDE 10 MG/1
1 CAPSULE ORAL
COMMUNITY

## 2023-12-01 RX ORDER — ASPIRIN 325 MG
50000 TABLET, DELAYED RELEASE (ENTERIC COATED) ORAL
COMMUNITY
Start: 2023-11-16

## 2023-12-01 RX ORDER — HYDROCODONE BITARTRATE AND ACETAMINOPHEN 7.5; 325 MG/1; MG/1
1 TABLET ORAL EVERY 6 HOURS PRN
COMMUNITY
Start: 2023-03-01

## 2023-12-01 NOTE — PROGRESS NOTES
Chief Complaint   Patient presents with    Callouses     Pain from Callouses on bottom and side of feet.  Consistently grows back when removed.             HPI:   Saul Huff is a 27 y.o. female who presents to clinic with concerns of painful corns at the base of bilateral great toes, present for the past years.    Pain is worse with walking and direct pressure.   Patient recalls no acute trauma to the area.     Treatment tried:  Salon pedicures but painful when they try to debride the lesions.        PCP:  Dulce Villalobos MD       Patient Active Problem List   Diagnosis    ALLERGIC RHINITIS    Hydradenitis    Weakness of right lower extremity    Posture abnormality             Current Outpatient Medications on File Prior to Visit   Medication Sig Dispense Refill    ABILIFY 5 mg Tab Take 5 mg by mouth every evening.      azelastine (ASTELIN) 137 mcg (0.1 %) nasal spray 1 spray 2 (two) times daily.      carbamide peroxide (DEBROX) 6.5 % otic solution Place 5 drops into both ears 2 (two) times daily. 18 mL 0    cetirizine (ZYRTEC) 10 MG tablet Take 10 mg by mouth.      cholecalciferol, vitamin D3, 1,250 mcg (50,000 unit) capsule Take 50,000 Units by mouth every 7 days.      cyclobenzaprine (FLEXERIL) 10 MG tablet Take 1 tablet (10 mg total) by mouth 3 (three) times daily as needed for Muscle spasms. 90 tablet 11    dextroamphetamine-amphetamine (ADDERALL XR) 10 MG 24 hr capsule 1 capsule in the morning Orally Once a day for 30 days      dicyclomine (BENTYL) 10 MG capsule 1 capsule.      fluticasone propionate (FLONASE) 50 mcg/actuation nasal spray 1 spray by Nasal route.      KORY FE 1.5/30, 28, 1.5 mg-30 mcg (21)/75 mg (7) tablet Take 1 tablet by mouth once daily. 90 tablet 3    HYDROcodone-acetaminophen (NORCO) 7.5-325 mg per tablet Take 1 tablet by mouth every 6 (six) hours as needed.      hydrOXYzine (ATARAX) 50 MG tablet 1 tablet at bedtime as needed Orally tid prn      hydrOXYzine pamoate (VISTARIL) 25 MG  "Cap TAKE 1 CAPSULE BY MOUTH EVERY DAY AS NEEDED FOR ANXIETY      ibuprofen (ADVIL,MOTRIN) 800 MG tablet Take 800 mg by mouth every 6 (six) hours as needed.      LAMICTAL 25 mg tablet Take 150 mg by mouth once daily.      methylPREDNISolone (MEDROL DOSEPACK) 4 mg tablet use as directed 1 each 0    montelukast (SINGULAIR) 10 mg tablet Take 1 tablet by mouth every evening.      ondansetron (ZOFRAN-ODT) 4 MG TbDL Take 1 tablet (4 mg total) by mouth every 8 (eight) hours as needed (nausea/ vomiting). 30 tablet 0    penicillin v potassium (VEETID) 500 MG tablet Take by mouth.      polyethylene glycol (GLYCOLAX) 17 gram/dose powder Take by mouth.      PRISTIQ 100 mg Tb24 Take 100 mg by mouth once daily.      promethazine-dextromethorphan (PROMETHAZINE-DM) 6.25-15 mg/5 mL Syrp Take 5 mLs by mouth 4 (four) times daily as needed.      tirzepatide (MOUNJARO) 2.5 mg/0.5 mL PnIj 2.5mg Subcutaneous once a week for 30 days      urea 20 % Crea Apply 1 application topically once daily. To dry skin on the feet. 75 g 10    amitriptyline (ELAVIL) 50 MG tablet Take 1 tablet (50 mg total) by mouth every evening. 30 tablet 11    pregabalin (LYRICA) 100 MG capsule Take 1 capsule (100 mg total) by mouth 2 (two) times daily. 60 capsule 6     No current facility-administered medications on file prior to visit.         Review of patient's allergies indicates:  No Known Allergies        ROS:  General ROS: negative for - chills, fatigue or fever  Cardiovascular ROS: no chest pain or dyspnea on exertion  Musculoskeletal ROS: negative for - joint pain or joint stiffness   Skin: Negative for rash, negative for nail or hair changes. Positive for  Corn/callus on the foot.         EXAM:    Vitals:    12/01/23 1113   BP: 138/80   Pulse: (!) 116   Weight: (!) 159.5 kg (351 lb 10.1 oz)   Height: 5' 5" (1.651 m)        General: alert and orient and in no apparent distress.        bilateral foot:  Vasc:   Palpable pedal pulses  Feet appropriately warm to " touch.   Capillary refill time within normal limits.   Edema: Absent      Neuro:   Epicritic sensation intact.   Tinels sign:  Absent  Mulders click: Absent  SWMF: Absent      MSK:     normal toes without deformities  Muscle strength:  5/5  Joints:  without crepitus  Deformity: none      Derm:      Nucleated, Hyperkeratosis located at the base of bilateral hallux, round, firm, fixed and tender, does not appear verrucous.  And sub 5th metatarsal left foot.   No other open lesions, macerations, or rashes noted.   Skin is otherwise Normal on the soles.  She does have similar lesions on her palms.   Erythema:  none at the affected location  Bruising:  absent          MEDICAL DECISION MAKING:       ICD-10-CM ICD-9-CM    1. Plantar porokeratosis, acquired  L85.1 701.1       2. Bilateral foot pain  M79.671 729.5     M79.672            I counseled the patient on the patient's conditions, their implications and medical management.  Shoe and activity modification as needed for relief.   Urea Cream.   Use daily.   Avoid barefoot walking to protect skin on the feet.   Lesions trimmed as a courtesy.   Call or return to clinic prn if these symptoms worsen or fail to improve as anticipated. Patient is amenable to plan.    I spent a total of 25 minutes on the day of the visit.  This includes face to face time and non-face to face time preparing to see the patient (eg, review of tests), obtaining and/or reviewing separately obtained history, documenting clinical information in the electronic or other health record, independently interpreting results and communicating results to the patient/family/caregiver, or care coordinator.

## 2023-12-02 DIAGNOSIS — L85.1 PLANTAR POROKERATOSIS, ACQUIRED: ICD-10-CM

## 2023-12-02 DIAGNOSIS — L84 PLANTAR CALLUS: ICD-10-CM

## 2023-12-04 RX ORDER — UREA 200 MG/G
CREAM TOPICAL
Qty: 85 G | Refills: 6 | Status: SHIPPED | OUTPATIENT
Start: 2023-12-04

## 2024-04-09 ENCOUNTER — HOSPITAL ENCOUNTER (EMERGENCY)
Facility: HOSPITAL | Age: 29
Discharge: HOME | End: 2024-04-09
Attending: EMERGENCY MEDICINE
Payer: COMMERCIAL

## 2024-04-09 VITALS
TEMPERATURE: 98 F | WEIGHT: 293 LBS | SYSTOLIC BLOOD PRESSURE: 113 MMHG | BODY MASS INDEX: 47.09 KG/M2 | OXYGEN SATURATION: 100 % | HEART RATE: 89 BPM | HEIGHT: 66 IN | RESPIRATION RATE: 16 BRPM | DIASTOLIC BLOOD PRESSURE: 72 MMHG

## 2024-04-09 DIAGNOSIS — K59.00 CONSTIPATION, UNSPECIFIED CONSTIPATION TYPE: ICD-10-CM

## 2024-04-09 DIAGNOSIS — R10.33 PERIUMBILICAL ABDOMINAL PAIN: Primary | ICD-10-CM

## 2024-04-09 LAB
ALBUMIN SERPL BCP-MCNC: 3.5 G/DL (ref 3.5–5.2)
ALP SERPL-CCNC: 103 U/L (ref 55–135)
ALT SERPL W/O P-5'-P-CCNC: 21 U/L (ref 10–44)
ANION GAP SERPL CALC-SCNC: 7 MMOL/L (ref 8–16)
AST SERPL-CCNC: 13 U/L (ref 10–40)
B-HCG UR QL: NEGATIVE
BASOPHILS # BLD AUTO: 0.07 K/UL (ref 0–0.2)
BASOPHILS NFR BLD: 0.4 % (ref 0–1.9)
BILIRUB SERPL-MCNC: 0.4 MG/DL (ref 0.1–1)
BILIRUB UR QL STRIP: NEGATIVE
BUN SERPL-MCNC: 8 MG/DL (ref 6–20)
CALCIUM SERPL-MCNC: 9.3 MG/DL (ref 8.7–10.5)
CHLORIDE SERPL-SCNC: 106 MMOL/L (ref 95–110)
CLARITY UR REFRACT.AUTO: CLEAR
CO2 SERPL-SCNC: 24 MMOL/L (ref 23–29)
COLOR UR AUTO: ABNORMAL
CREAT SERPL-MCNC: 0.8 MG/DL (ref 0.5–1.4)
CTP QC/QA: YES
DIFFERENTIAL METHOD BLD: ABNORMAL
EOSINOPHIL # BLD AUTO: 0.1 K/UL (ref 0–0.5)
EOSINOPHIL NFR BLD: 0.9 % (ref 0–8)
ERYTHROCYTE [DISTWIDTH] IN BLOOD BY AUTOMATED COUNT: 11.9 % (ref 11.5–14.5)
EST. GFR  (NO RACE VARIABLE): >60 ML/MIN/1.73 M^2
GLUCOSE SERPL-MCNC: 84 MG/DL (ref 70–110)
GLUCOSE UR QL STRIP: NEGATIVE
HCT VFR BLD AUTO: 39.9 % (ref 37–48.5)
HCV AB SERPL QL IA: NORMAL
HGB BLD-MCNC: 13.1 G/DL (ref 12–16)
HGB UR QL STRIP: NEGATIVE
HIV 1+2 AB+HIV1 P24 AG SERPL QL IA: NORMAL
IMM GRANULOCYTES # BLD AUTO: 0.04 K/UL (ref 0–0.04)
IMM GRANULOCYTES NFR BLD AUTO: 0.3 % (ref 0–0.5)
KETONES UR QL STRIP: ABNORMAL
LEUKOCYTE ESTERASE UR QL STRIP: NEGATIVE
LIPASE SERPL-CCNC: 7 U/L (ref 4–60)
LYMPHOCYTES # BLD AUTO: 3 K/UL (ref 1–4.8)
LYMPHOCYTES NFR BLD: 19.2 % (ref 18–48)
MCH RBC QN AUTO: 32.9 PG (ref 27–31)
MCHC RBC AUTO-ENTMCNC: 32.8 G/DL (ref 32–36)
MCV RBC AUTO: 100 FL (ref 82–98)
MONOCYTES # BLD AUTO: 0.5 K/UL (ref 0.3–1)
MONOCYTES NFR BLD: 3.2 % (ref 4–15)
NEUTROPHILS # BLD AUTO: 12 K/UL (ref 1.8–7.7)
NEUTROPHILS NFR BLD: 76 % (ref 38–73)
NITRITE UR QL STRIP: NEGATIVE
NRBC BLD-RTO: 0 /100 WBC
PH UR STRIP: 5 [PH] (ref 5–8)
PLATELET # BLD AUTO: 364 K/UL (ref 150–450)
PMV BLD AUTO: 10.2 FL (ref 9.2–12.9)
POTASSIUM SERPL-SCNC: 4.3 MMOL/L (ref 3.5–5.1)
PROT SERPL-MCNC: 7.8 G/DL (ref 6–8.4)
PROT UR QL STRIP: NEGATIVE
RBC # BLD AUTO: 3.98 M/UL (ref 4–5.4)
SODIUM SERPL-SCNC: 137 MMOL/L (ref 136–145)
SP GR UR STRIP: 1.03 (ref 1–1.03)
URN SPEC COLLECT METH UR: ABNORMAL
WBC # BLD AUTO: 15.75 K/UL (ref 3.9–12.7)

## 2024-04-09 PROCEDURE — 81025 URINE PREGNANCY TEST: CPT | Performed by: EMERGENCY MEDICINE

## 2024-04-09 PROCEDURE — 87389 HIV-1 AG W/HIV-1&-2 AB AG IA: CPT | Performed by: PHYSICIAN ASSISTANT

## 2024-04-09 PROCEDURE — 83690 ASSAY OF LIPASE: CPT | Performed by: EMERGENCY MEDICINE

## 2024-04-09 PROCEDURE — 63600175 PHARM REV CODE 636 W HCPCS: Performed by: EMERGENCY MEDICINE

## 2024-04-09 PROCEDURE — 81003 URINALYSIS AUTO W/O SCOPE: CPT | Performed by: EMERGENCY MEDICINE

## 2024-04-09 PROCEDURE — 96375 TX/PRO/DX INJ NEW DRUG ADDON: CPT

## 2024-04-09 PROCEDURE — 80053 COMPREHEN METABOLIC PANEL: CPT | Performed by: EMERGENCY MEDICINE

## 2024-04-09 PROCEDURE — 85025 COMPLETE CBC W/AUTO DIFF WBC: CPT | Performed by: EMERGENCY MEDICINE

## 2024-04-09 PROCEDURE — 96374 THER/PROPH/DIAG INJ IV PUSH: CPT

## 2024-04-09 PROCEDURE — 86803 HEPATITIS C AB TEST: CPT | Performed by: PHYSICIAN ASSISTANT

## 2024-04-09 PROCEDURE — 99285 EMERGENCY DEPT VISIT HI MDM: CPT | Mod: 25

## 2024-04-09 PROCEDURE — 25500020 PHARM REV CODE 255: Performed by: EMERGENCY MEDICINE

## 2024-04-09 RX ORDER — SYRING-NEEDL,DISP,INSUL,0.3 ML 29 G X1/2"
296 SYRINGE, EMPTY DISPOSABLE MISCELLANEOUS ONCE
Qty: 296 ML | Refills: 0 | Status: SHIPPED | OUTPATIENT
Start: 2024-04-09 | End: 2024-04-09

## 2024-04-09 RX ORDER — ONDANSETRON 4 MG/1
4 TABLET, FILM COATED ORAL EVERY 6 HOURS PRN
Qty: 20 TABLET | Refills: 0 | Status: SHIPPED | OUTPATIENT
Start: 2024-04-09

## 2024-04-09 RX ORDER — ONDANSETRON HYDROCHLORIDE 2 MG/ML
4 INJECTION, SOLUTION INTRAVENOUS
Status: COMPLETED | OUTPATIENT
Start: 2024-04-09 | End: 2024-04-09

## 2024-04-09 RX ORDER — MORPHINE SULFATE 4 MG/ML
4 INJECTION, SOLUTION INTRAMUSCULAR; INTRAVENOUS
Status: COMPLETED | OUTPATIENT
Start: 2024-04-09 | End: 2024-04-09

## 2024-04-09 RX ADMIN — MORPHINE SULFATE 4 MG: 4 INJECTION INTRAVENOUS at 04:04

## 2024-04-09 RX ADMIN — ONDANSETRON 4 MG: 2 INJECTION INTRAMUSCULAR; INTRAVENOUS at 04:04

## 2024-04-09 RX ADMIN — IOHEXOL 100 ML: 350 INJECTION, SOLUTION INTRAVENOUS at 05:04

## 2024-04-09 NOTE — ED NOTES
Patient identifiers verified and correct for  MS Huff  C/C: Abd Pain SEE NN  APPEARANCE: awake and alert in NAD. PAIN  6/10  SKIN: warm, dry and intact. No breakdown or bruising.  MUSCULOSKELETAL: Patient moving all extremities spontaneously, no obvious swelling or deformities noted. Ambulates independently.  RESPIRATORY: Denies shortness of breath.Respirations unlabored.   CARDIAC: Denies CP, 2+ distal pulses; no peripheral edema  ABDOMEN: ABdomen round, reports nausea, no emesis   : voids spontaneously, denies difficulty  Neurologic: AAO x 4; follows commands equal strength in all extremities; denies numbness/tingling. Denies dizziness Denies new weakness,

## 2024-04-09 NOTE — PROVIDER PROGRESS NOTES - EMERGENCY DEPT.
ED Attending Hand-off Note    I have discussed the patient's history and presentation in the ED with Mariely Casas MD    Brief H&P: Patient is a 28 y.o. female who presented to the ED with Multiple complaints (Constipation then loose stool for 2 weeks, now abd  pain, )        Pending studies and consultations: CT, then likely d/c    Disposition:  Will continue to monitor, update patient on results of testing and determine appropriate additional treatment for the duration of stay in ED.  Aaron Prieto MD 5:59 PM 4/9/2024        UPDATES:   CT non-acute. Patient will be discharged at this time. Patient has been given home care instructions, follow up instructions, and strict return precautions. They agree with and are comfortable with the plan.             Clinical Impression  :  Periumbilical abdominal pain (Primary)  Constipation, unspecified constipation type       ED Disposition Condition    Discharge Stable

## 2024-04-09 NOTE — Clinical Note
"Saul Roque" Daria was seen and treated in our emergency department on 4/9/2024.  She may return to work on 04/10/2024.       If you have any questions or concerns, please don't hesitate to call.      Adriane Aragon RN    "

## 2024-04-09 NOTE — ED NOTES
"Patient states abdominal " soreness" x 2 weeks, states currently pain "all over" seen per PMD with known hernia, Zofran rx not working for nausea.   "

## 2024-04-09 NOTE — ED PROVIDER NOTES
Encounter Date: 4/9/2024       History     Chief Complaint   Patient presents with    Multiple complaints     Constipation then loose stool for 2 weeks, now abd  pain,      HPI  28-year-old female with a past medical history of anxiety, fibromyalgia, GERD, IBS who presents with abdominal pain 2 weeks.  Initially started out as loose stools that were nonbloody, lower abdominal pain and right lower quadrant pain.  However for the last week she has had those symptoms as well as constipation and epigastric pain.  Eating makes the pain worse.  She reports she did have a bowel movement Monday and today but they were small and hard.  Again, no black or bloody stools.  Still passing gas.  No urinary symptoms including hematuria, dysuria, urgency or frequency.  No vaginal pain, bleeding, discharge, or concern for STI.  Reports a temperature of 99.2° weeks ago but no true fevers.  She has had nausea but no vomiting.  Has had a poor appetite.  Reports that she was never had any intra-abdominal surgeries but was told she needed surgery or an umbilical hernia a year ago.  She thinks there might be some bulging around her abdomen      Review of patient's allergies indicates:  No Known Allergies  Past Medical History:   Diagnosis Date    Allergy     Anxiety     Fibromyalgia     Recurrent upper respiratory infection (URI)      Past Surgical History:   Procedure Laterality Date    SINUS SURGERY       Family History   Problem Relation Age of Onset    Hypertension Mother         sleep apnea    Diabetes Father         schizophrenia, bipolar, hypertension, chf    Lupus Sister         MS    Uterine cancer Maternal Grandmother     Alcohol abuse Maternal Grandfather     Ovarian cancer Paternal Grandmother     Schizophrenia Paternal Grandfather     Breast cancer Neg Hx     Colon cancer Neg Hx     Allergic rhinitis Neg Hx     Allergies Neg Hx     Angioedema Neg Hx     Asthma Neg Hx     Atopy Neg Hx     Eczema Neg Hx     Immunodeficiency Neg  Hx     Rhinitis Neg Hx     Urticaria Neg Hx      Social History     Tobacco Use    Smoking status: Every Day     Current packs/day: 0.50     Types: Cigarettes    Smokeless tobacco: Never   Substance Use Topics    Alcohol use: No    Drug use: No     Review of Systems    Physical Exam     Initial Vitals [04/09/24 1330]   BP Pulse Resp Temp SpO2   121/68 108 16 98.4 °F (36.9 °C) 100 %      MAP       --         Physical Exam    Nursing note and vitals reviewed.  Constitutional: She appears well-developed and well-nourished. No distress.   HENT:   Head: Normocephalic and atraumatic.   Nose: Nose normal.   Eyes: Conjunctivae and EOM are normal. Pupils are equal, round, and reactive to light.   Neck:   Normal range of motion.  Cardiovascular:  Normal rate, regular rhythm and normal heart sounds.     Exam reveals no gallop and no friction rub.       No murmur heard.  Pulmonary/Chest: Breath sounds normal. No respiratory distress. She has no wheezes. She has no rhonchi. She has no rales.   Abdominal: Abdomen is soft. Bowel sounds are normal. She exhibits no distension. There is abdominal tenderness.   No obvious hernias   Diffusely tender There is no guarding.   Musculoskeletal:         General: Normal range of motion.      Cervical back: Normal range of motion.     Neurological: She is alert and oriented to person, place, and time. She has normal strength.   Skin: Skin is warm and dry. Capillary refill takes less than 2 seconds.         ED Course   Procedures  Labs Reviewed   HIV 1 / 2 ANTIBODY   HEPATITIS C ANTIBODY   CBC W/ AUTO DIFFERENTIAL   COMPREHENSIVE METABOLIC PANEL   LIPASE   URINALYSIS, REFLEX TO URINE CULTURE   POCT URINE PREGNANCY          Imaging Results    None          Medications   morphine injection 4 mg (has no administration in time range)   ondansetron injection 4 mg (has no administration in time range)     Medical Decision Making  28-year-old female who presents with 2 weeks of abdominal pain, nausea,  change in bowel movements.  Urinary or  symptoms.  She was diffusely tender on exam.  Differential includes pancreatitis, cholecystitis, appendicitis, diverticulitis or other colitis, IBS.  Again, nothing focal on exam.  We will get labs, UA, CT abdomen/pelvis.  Signed out to oncoming attending.                                      Clinical Impression:  Final diagnoses:  [R10.33] Periumbilical abdominal pain (Primary)                 Mariely Casas MD  04/09/24 4341

## 2024-06-04 ENCOUNTER — HOSPITAL ENCOUNTER (EMERGENCY)
Facility: HOSPITAL | Age: 29
Discharge: HOME | End: 2024-06-04
Attending: EMERGENCY MEDICINE
Payer: COMMERCIAL

## 2024-06-04 VITALS
TEMPERATURE: 98 F | HEART RATE: 105 BPM | HEIGHT: 65 IN | RESPIRATION RATE: 18 BRPM | DIASTOLIC BLOOD PRESSURE: 90 MMHG | WEIGHT: 293 LBS | OXYGEN SATURATION: 98 % | SYSTOLIC BLOOD PRESSURE: 152 MMHG | BODY MASS INDEX: 48.82 KG/M2

## 2024-06-04 DIAGNOSIS — L29.9 ITCHING: ICD-10-CM

## 2024-06-04 DIAGNOSIS — L50.9 URTICARIA: Primary | ICD-10-CM

## 2024-06-04 DIAGNOSIS — R21 RASH: ICD-10-CM

## 2024-06-04 PROCEDURE — 63600175 PHARM REV CODE 636 W HCPCS: Performed by: STUDENT IN AN ORGANIZED HEALTH CARE EDUCATION/TRAINING PROGRAM

## 2024-06-04 PROCEDURE — 99283 EMERGENCY DEPT VISIT LOW MDM: CPT

## 2024-06-04 RX ORDER — PREDNISONE 50 MG/1
50 TABLET ORAL DAILY
Qty: 5 TABLET | Refills: 0 | Status: SHIPPED | OUTPATIENT
Start: 2024-06-04 | End: 2024-06-09

## 2024-06-04 RX ORDER — PREDNISONE 20 MG/1
60 TABLET ORAL
Status: COMPLETED | OUTPATIENT
Start: 2024-06-04 | End: 2024-06-04

## 2024-06-04 RX ORDER — FAMOTIDINE 20 MG/1
20 TABLET, FILM COATED ORAL 2 TIMES DAILY
Qty: 28 TABLET | Refills: 0 | Status: SHIPPED | OUTPATIENT
Start: 2024-06-04 | End: 2024-06-18

## 2024-06-04 RX ADMIN — PREDNISONE 60 MG: 20 TABLET ORAL at 03:06

## 2024-06-04 NOTE — Clinical Note
"Saul Roque" Daria was seen and treated in our emergency department on 6/4/2024.  She may return to work on 06/06/2024.       If you have any questions or concerns, please don't hesitate to call.      Kristin Campos MD"

## 2024-06-04 NOTE — ED TRIAGE NOTES
Saul Huff, a 28 y.o. female presents to the ED w/ complaint of bilateral arm and leg itching x 2 days, states that she had a rash with hives that started on Sunday, unclear of when it started, now patient cannot stop itching, and she has swelling to her fingers.     Triage note:  Chief Complaint   Patient presents with    Rash     Rash to bilateral arms and legs since sunday     Review of patient's allergies indicates:  No Known Allergies  Past Medical History:   Diagnosis Date    Allergy     Anxiety     Fibromyalgia     Recurrent upper respiratory infection (URI)        APPEARANCE: awake and alert in NAD.  SKIN: warm, dry and intact. + rash noted to both arms,   MUSCULOSKELETAL: Patient moving all extremities spontaneously, no obvious swelling or deformities noted. Ambulates independently.  RESPIRATORY: Denies shortness of breath.Respirations unlabored.   CARDIAC: Denies CP, 2+ distal pulses; no peripheral edema  ABDOMEN: S/ND/NT, Denies nausea  : voids spontaneously, denies difficulty  Neurologic: AAO x 4; follows commands equal strength in all extremities; denies numbness/tingling. Denies dizziness

## 2024-06-04 NOTE — ED PROVIDER NOTES
Encounter Date: 6/4/2024       History     Chief Complaint   Patient presents with    Rash     Rash to bilateral arms and legs since sunday     HPI    28-year-old female significant medical history of anxiety, fibromyalgia, hidradenitis presenting for rash.      Patient endorses 2 days of migratory erythematous raised pruritic rash to her right upper extremity.  The rash has since spread to her other arm, bilateral palms, chest, abdomen, and bilateral lower extremities.  She attempted to use hydrocortisone cream, and oral Benadryl without significant improvement of her symptoms.  She tried a new yogurt Parfait 2 days ago, however can not think of any other new foods, new soaps or other inciting agents might have caused the rash.    She was a cat at home who it was not receive flea medicine, she was not on any camping trips, she generally does not spend long periods of time outside.  She was not spent any time in the woods.  She denies rash on her feet.  She denies fever, chills, nausea, vomiting, chest pain, shortness of breath, diarrhea, constipation.  She does not have any known bug bites that she was aware of.    Review of patient's allergies indicates:  No Known Allergies  Past Medical History:   Diagnosis Date    Allergy     Anxiety     Fibromyalgia     Recurrent upper respiratory infection (URI)      Past Surgical History:   Procedure Laterality Date    SINUS SURGERY       Family History   Problem Relation Name Age of Onset    Hypertension Mother          sleep apnea    Diabetes Father          schizophrenia, bipolar, hypertension, chf    Lupus Sister          MS    Uterine cancer Maternal Grandmother      Alcohol abuse Maternal Grandfather      Ovarian cancer Paternal Grandmother      Schizophrenia Paternal Grandfather      Breast cancer Neg Hx      Colon cancer Neg Hx      Allergic rhinitis Neg Hx      Allergies Neg Hx      Angioedema Neg Hx      Asthma Neg Hx      Atopy Neg Hx      Eczema Neg Hx       Immunodeficiency Neg Hx      Rhinitis Neg Hx      Urticaria Neg Hx       Social History     Tobacco Use    Smoking status: Every Day     Current packs/day: 0.50     Types: Cigarettes    Smokeless tobacco: Never   Substance Use Topics    Alcohol use: No    Drug use: No     Review of Systems  See HPI for pertinent ROS.   Physical Exam     Initial Vitals [06/04/24 0242]   BP Pulse Resp Temp SpO2   (!) 140/83 110 18 98.5 °F (36.9 °C) 95 %      MAP       --         Physical Exam    Constitutional: She appears well-developed and well-nourished.   HENT:   Head: Normocephalic and atraumatic.   Eyes: Conjunctivae are normal. No scleral icterus.   Neck: No JVD present.   Cardiovascular:  Normal rate, regular rhythm and normal heart sounds.     Exam reveals no gallop and no friction rub.       No murmur heard.  Pulmonary/Chest: Breath sounds normal. No stridor. She has no wheezes. She has no rhonchi. She has no rales.   Abdominal: Abdomen is soft. There is no abdominal tenderness. There is no rebound and no guarding.   Musculoskeletal:         General: No tenderness or edema.     Neurological: She is alert.   Skin: Skin is warm. Capillary refill takes less than 2 seconds. Rash noted.   Raised erythematous urticarial rash on bilateral forearms, bilateral hands on the palms, on her trunk, chest, and bilateral lower extremities.  The rash is not present on her dorsum or soles of her feet.  Rash not present on the back.  Blanching   Psychiatric: She has a normal mood and affect.         ED Course   Procedures  Labs Reviewed - No data to display       Imaging Results    None          Medications   predniSONE tablet 60 mg (60 mg Oral Given 6/4/24 0350)     Medical Decision Making  Risk  Prescription drug management.              Attending Attestation:   Physician Attestation Statement for Resident:  As the supervising MD   Physician Attestation Statement: I have personally seen and examined this patient.   I agree with the above  history.  -:   As the supervising MD I agree with the above PE.     As the supervising MD I agree with the above treatment, course, plan, and disposition.     I have reviewed the following: old records at this facility.                                   28-year-old female described as above presenting for 2 days of progressive migratory urticarial rash on her bilateral arms, palms, trunk and legs.  On presentation, vital signs are stable she was mildly hypotensive, and mildly tachycardic.  Physical exam described as above consistent with likely urticarial rash.  She was given initial dose of prednisone with plan for 4 day prednisone burst with follow up with PCP and allergy/immunology.  She was given strict return precautions.    Considered on differential diagnosis yamilet mountain spotted fever, however rash not present on the soles, considered Lyme disease, however no targetoid lesions noted on patient and no subjective fever or chills.  Rash most consistent with allergic reaction, no evidence of acute aphylaxis.       Clinical Impression:  Final diagnoses:  [R21] Rash  [L29.9] Itching  [L50.9] Urticaria (Primary)          ED Disposition Condition    Discharge Stable          ED Prescriptions       Medication Sig Dispense Start Date End Date Auth. Provider    predniSONE (DELTASONE) 50 MG Tab Take 1 tablet (50 mg total) by mouth once daily. for 5 days 5 tablet 6/4/2024 6/9/2024 Trina Farr MD    famotidine (PEPCID) 20 MG tablet Take 1 tablet (20 mg total) by mouth 2 (two) times daily. for 14 days 28 tablet 6/4/2024 6/18/2024 Kristin Campos MD          Follow-up Information       Follow up With Specialties Details Why Contact Info Additional Information    Dulce Villalobos MD Family Medicine Schedule an appointment as soon as possible for a visit   [address]  [facility] [address]  [phone]       Matthew Pretty - Allergy/Immunology Allergy Schedule an appointment as soon as possible for a visit   [address]  [facility] [address]  [phone] [address] [place] - 9th Floor    [facility] - Dermatology [address] Dermatology Schedule an appointment as soon as possible for a visit   [address] [facility] [id number] [phone] Dermatology - Main [facility], Clinic 11th Floor Please park in [facility]. Use Clinic elevators 12 & 13 to get to the 11th floor             Trina Farr MD  Resident  06/04/24 0628       Kristin Campos MD  06/04/24 2156

## 2024-06-04 NOTE — DISCHARGE INSTRUCTIONS
"Please read the provided information about allergic reactions. Symptoms of a severe allergic reaction include:  - Skin rashes, itching and/or hives  - Swelling of the face, lips, tongue or throat  - Shortness of breath, trouble breathing, wheezing (whistling sound during breathing)  - Dizziness and/or fainting  - Stomach pain or cramps, vomiting or diarrhea  - Feeling like something awful is about to happen    Treatments were:  Medications - No data to display    Home Care Instructions:  - Take the prescribed steroid medication as directed  - Take Benadryl according to packaging directions for symptoms including itchy rash  - If you have symptoms of a severe allergic reaction ("anaphylaxis"), use the prescribed epinephrine autoinjector and return to the ER immediately.  - Medications: Continue taking home medications as prescribed    Follow-Up Plan:  - Follow-up with: Primary care doctor within 3 - 5 days  - Additional outpatient testing and/or evaluation as directed by your doctor  - If you continue to have allergy issues, you may benefit from seeing an allergist    Return to the emergency department for symptoms including but not limited to: worsening symptoms, symptoms of severe allergic reaction listed above, shortness of breath or chest pain, vomiting with inability to hold down fluids, fevers greater than 100.4°F, passing out/unconsciousness, or other concerning symptoms.  "

## 2024-11-02 ENCOUNTER — HOSPITAL ENCOUNTER (EMERGENCY)
Facility: HOSPITAL | Age: 29
Discharge: HOME | End: 2024-11-02
Attending: EMERGENCY MEDICINE
Payer: COMMERCIAL

## 2024-11-02 VITALS
RESPIRATION RATE: 18 BRPM | SYSTOLIC BLOOD PRESSURE: 109 MMHG | HEART RATE: 78 BPM | BODY MASS INDEX: 48.82 KG/M2 | TEMPERATURE: 98 F | OXYGEN SATURATION: 99 % | WEIGHT: 293 LBS | HEIGHT: 65 IN | DIASTOLIC BLOOD PRESSURE: 70 MMHG

## 2024-11-02 DIAGNOSIS — R07.9 CHEST PAIN: ICD-10-CM

## 2024-11-02 DIAGNOSIS — R07.89 CHEST WALL PAIN: Primary | ICD-10-CM

## 2024-11-02 DIAGNOSIS — R07.89 CHEST DISCOMFORT: ICD-10-CM

## 2024-11-02 LAB
ALBUMIN SERPL BCP-MCNC: 3.4 G/DL (ref 3.5–5.2)
ALP SERPL-CCNC: 94 U/L (ref 40–150)
ALT SERPL W/O P-5'-P-CCNC: 15 U/L (ref 10–44)
ANION GAP SERPL CALC-SCNC: 7 MMOL/L (ref 8–16)
AST SERPL-CCNC: 30 U/L (ref 10–40)
BASOPHILS # BLD AUTO: 0.07 K/UL (ref 0–0.2)
BASOPHILS NFR BLD: 0.6 % (ref 0–1.9)
BILIRUB SERPL-MCNC: 0.2 MG/DL (ref 0.1–1)
BNP SERPL-MCNC: 27 PG/ML (ref 0–99)
BUN SERPL-MCNC: 8 MG/DL (ref 6–20)
CALCIUM SERPL-MCNC: 9.1 MG/DL (ref 8.7–10.5)
CHLORIDE SERPL-SCNC: 108 MMOL/L (ref 95–110)
CO2 SERPL-SCNC: 23 MMOL/L (ref 23–29)
CREAT SERPL-MCNC: 0.7 MG/DL (ref 0.5–1.4)
D DIMER PPP IA.FEU-MCNC: 0.34 MG/L FEU
DIFFERENTIAL METHOD BLD: ABNORMAL
EOSINOPHIL # BLD AUTO: 0.3 K/UL (ref 0–0.5)
EOSINOPHIL NFR BLD: 2.5 % (ref 0–8)
ERYTHROCYTE [DISTWIDTH] IN BLOOD BY AUTOMATED COUNT: 12 % (ref 11.5–14.5)
EST. GFR  (NO RACE VARIABLE): >60 ML/MIN/1.73 M^2
GLUCOSE SERPL-MCNC: 78 MG/DL (ref 70–110)
HCT VFR BLD AUTO: 41.4 % (ref 37–48.5)
HGB BLD-MCNC: 13 G/DL (ref 12–16)
IMM GRANULOCYTES # BLD AUTO: 0.02 K/UL (ref 0–0.04)
IMM GRANULOCYTES NFR BLD AUTO: 0.2 % (ref 0–0.5)
LYMPHOCYTES # BLD AUTO: 5.9 K/UL (ref 1–4.8)
LYMPHOCYTES NFR BLD: 52.5 % (ref 18–48)
MCH RBC QN AUTO: 32.7 PG (ref 27–31)
MCHC RBC AUTO-ENTMCNC: 31.4 G/DL (ref 32–36)
MCV RBC AUTO: 104 FL (ref 82–98)
MONOCYTES # BLD AUTO: 0.5 K/UL (ref 0.3–1)
MONOCYTES NFR BLD: 4.5 % (ref 4–15)
NEUTROPHILS # BLD AUTO: 4.5 K/UL (ref 1.8–7.7)
NEUTROPHILS NFR BLD: 39.7 % (ref 38–73)
NRBC BLD-RTO: 0 /100 WBC
PLATELET # BLD AUTO: 315 K/UL (ref 150–450)
PMV BLD AUTO: 11 FL (ref 9.2–12.9)
POTASSIUM SERPL-SCNC: 4.5 MMOL/L (ref 3.5–5.1)
PROT SERPL-MCNC: 7.8 G/DL (ref 6–8.4)
RBC # BLD AUTO: 3.98 M/UL (ref 4–5.4)
SODIUM SERPL-SCNC: 138 MMOL/L (ref 136–145)
TROPONIN I SERPL DL<=0.01 NG/ML-MCNC: <0.006 NG/ML (ref 0–0.03)
WBC # BLD AUTO: 11.21 K/UL (ref 3.9–12.7)

## 2024-11-02 PROCEDURE — 83880 ASSAY OF NATRIURETIC PEPTIDE: CPT | Performed by: EMERGENCY MEDICINE

## 2024-11-02 PROCEDURE — 85379 FIBRIN DEGRADATION QUANT: CPT | Performed by: EMERGENCY MEDICINE

## 2024-11-02 PROCEDURE — 93005 ELECTROCARDIOGRAM TRACING: CPT

## 2024-11-02 PROCEDURE — 85025 COMPLETE CBC W/AUTO DIFF WBC: CPT | Performed by: EMERGENCY MEDICINE

## 2024-11-02 PROCEDURE — 80053 COMPREHEN METABOLIC PANEL: CPT | Performed by: EMERGENCY MEDICINE

## 2024-11-02 PROCEDURE — 84484 ASSAY OF TROPONIN QUANT: CPT | Performed by: EMERGENCY MEDICINE

## 2024-11-02 PROCEDURE — 25000003 PHARM REV CODE 250: Performed by: EMERGENCY MEDICINE

## 2024-11-02 PROCEDURE — 93010 ELECTROCARDIOGRAM REPORT: CPT | Mod: ,,, | Performed by: INTERNAL MEDICINE

## 2024-11-02 PROCEDURE — 99285 EMERGENCY DEPT VISIT HI MDM: CPT | Mod: 25

## 2024-11-02 RX ORDER — METHOCARBAMOL 500 MG/1
1000 TABLET, FILM COATED ORAL 3 TIMES DAILY
Qty: 30 TABLET | Refills: 0 | Status: SHIPPED | OUTPATIENT
Start: 2024-11-02 | End: 2024-11-02

## 2024-11-02 RX ORDER — LIDOCAINE 50 MG/G
1 PATCH TOPICAL DAILY
Qty: 30 PATCH | Refills: 0 | Status: SHIPPED | OUTPATIENT
Start: 2024-11-02 | End: 2024-11-02

## 2024-11-02 RX ORDER — METHOCARBAMOL 500 MG/1
500 TABLET, FILM COATED ORAL
Status: COMPLETED | OUTPATIENT
Start: 2024-11-02 | End: 2024-11-02

## 2024-11-02 RX ORDER — METHOCARBAMOL 500 MG/1
1000 TABLET, FILM COATED ORAL 3 TIMES DAILY
Qty: 30 TABLET | Refills: 0 | Status: SHIPPED | OUTPATIENT
Start: 2024-11-02 | End: 2024-11-07

## 2024-11-02 RX ORDER — LIDOCAINE 50 MG/G
1 PATCH TOPICAL DAILY
Qty: 30 PATCH | Refills: 0 | Status: SHIPPED | OUTPATIENT
Start: 2024-11-02

## 2024-11-02 RX ORDER — METHOCARBAMOL 500 MG/1
1000 TABLET, FILM COATED ORAL
Status: COMPLETED | OUTPATIENT
Start: 2024-11-02 | End: 2024-11-02

## 2024-11-02 RX ADMIN — METHOCARBAMOL 1000 MG: 500 TABLET ORAL at 02:11

## 2024-11-02 RX ADMIN — METHOCARBAMOL 500 MG: 500 TABLET ORAL at 12:11

## 2024-11-02 NOTE — ED PROVIDER NOTES
Encounter Date: 2024       History     Chief Complaint   Patient presents with    Chest Pain     Denies cardiac hx ,started yest     HPI    28-year-old female with a past medical history of anxiety, fibromyalgia, recurrent URIs who presents with chest pain.  Started last night and has been constant since.  It is located in the center of her chest.  Hurts with any movement including sitting up from bed, lifting things, breathing.  She denies any lower extremity swelling, hemoptysis, history of DVT or PE, recent travel or surgery.  No recorded fevers or chills but has felt hot and cold.  A cough.  No rashes.  She was a smoker.  No diabetes or hypertension.  Father  in his 50s of a heart attack.  States she was had a lot of stress going on recently.  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  No other trauma.    Review of patient's allergies indicates:  No Known Allergies  Past Medical History:   Diagnosis Date    Allergy     Anxiety     Fibromyalgia     Recurrent upper respiratory infection (URI)      Past Surgical History:   Procedure Laterality Date    SINUS SURGERY       Family History   Problem Relation Name Age of Onset    Hypertension Mother          sleep apnea    Diabetes Father          schizophrenia, bipolar, hypertension, chf    Lupus Sister          MS    Uterine cancer Maternal Grandmother      Alcohol abuse Maternal Grandfather      Ovarian cancer Paternal Grandmother      Schizophrenia Paternal Grandfather      Breast cancer Neg Hx      Colon cancer Neg Hx      Allergic rhinitis Neg Hx      Allergies Neg Hx      Angioedema Neg Hx      Asthma Neg Hx      Atopy Neg Hx      Eczema Neg Hx      Immunodeficiency Neg Hx      Rhinitis Neg Hx      Urticaria Neg Hx       Social History     Tobacco Use    Smoking status: Every Day     Current packs/day: 0.50     Types: Cigarettes    Smokeless tobacco: Never    Substance Use Topics    Alcohol use: No    Drug use: No     Review of Systems    Physical Exam     Initial Vitals [11/02/24 1059]   BP Pulse Resp Temp SpO2   138/83 90 18 98.4 °F (36.9 °C) 98 %      MAP       --         Physical Exam    Nursing note and vitals reviewed.  Constitutional: She appears well-developed and well-nourished. No distress.   HENT:   Head: Normocephalic and atraumatic.   Nose: Nose normal.   Eyes: Conjunctivae and EOM are normal. Pupils are equal, round, and reactive to light.   Neck:   Normal range of motion.  Cardiovascular:  Normal rate, regular rhythm and normal heart sounds.           Pulmonary/Chest: Breath sounds normal. No respiratory distress. She has no wheezes. She has no rhonchi. She has no rales.   Sternal tenderness   Abdominal: Abdomen is soft. She exhibits no distension. There is no abdominal tenderness. There is no rebound and no guarding.   Musculoskeletal:         General: No tenderness or edema. Normal range of motion.      Cervical back: Normal range of motion.     Neurological: She is alert and oriented to person, place, and time. She has normal strength. No sensory deficit.   Skin: Skin is warm and dry. Capillary refill takes less than 2 seconds.   Psychiatric: She has a normal mood and affect.         ED Course   Procedures  Labs Reviewed   CBC W/ AUTO DIFFERENTIAL - Abnormal       Result Value    WBC 11.21      RBC 3.98 (*)     Hemoglobin 13.0      Hematocrit 41.4       (*)     MCH 32.7 (*)     MCHC 31.4 (*)     RDW 12.0      Platelets 315      MPV 11.0      Immature Granulocytes 0.2      Gran # (ANC) 4.5      Immature Grans (Abs) 0.02      Lymph # 5.9 (*)     Mono # 0.5      Eos # 0.3      Baso # 0.07      nRBC 0      Gran % 39.7      Lymph % 52.5 (*)     Mono % 4.5      Eosinophil % 2.5      Basophil % 0.6      Differential Method Automated     COMPREHENSIVE METABOLIC PANEL - Abnormal    Sodium 138      Potassium 4.5      Chloride 108      CO2 23      Glucose  78      BUN 8      Creatinine 0.7      Calcium 9.1      Total Protein 7.8      Albumin 3.4 (*)     Total Bilirubin 0.2      Alkaline Phosphatase 94      AST 30      ALT 15      eGFR >60.0      Anion Gap 7 (*)    TROPONIN I    Troponin I <0.006     B-TYPE NATRIURETIC PEPTIDE    BNP 27     D DIMER, QUANTITATIVE    D-Dimer 0.34            Imaging Results              X-Ray Chest PA And Lateral (In process)                      Medications   methocarbamoL tablet 500 mg (500 mg Oral Given 11/2/24 1204)     Medical Decision Making  28-year-old female who presents with chest pain since last night.  Constant in nature.  She overall appears well, not toxic appearing.  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  However, she was on oral birth control and has pleuritic pain so we will need to rule out PE.  She was low risk so can not do a D-dimer.  We will also need to rule out ACS.  Risk factors include smoking, weight, and family history.  EKG is reassuring.  If all negative, we will treat for musculoskeletal pain.    Amount and/or Complexity of Data Reviewed  Labs: ordered.  Radiology: ordered.  ECG/medicine tests: ordered and independent interpretation performed.     Details: Sinus, regular, rate 80s, normal intervals, no right heart strain, no STEMI    Risk  Prescription drug management.               ED Course as of 11/02/24 1333   Sat Nov 02, 2024   1332 On my independent interpretation of the chest x-ray, no acute process [PW]      ED Course User Index  [PW] Mariely Casas MD                           Clinical Impression:  Final diagnoses:  [R07.89] Chest discomfort  [R07.9] Chest pain                 Mariely Casas MD  11/02/24 1334

## 2024-11-02 NOTE — ED TRIAGE NOTES
Patient identifiers for Saul Huff 28 y.o. female checked and correct.  Chief Complaint   Patient presents with    Chest Pain     Denies cardiac hx ,started yest     Past Medical History:   Diagnosis Date    Allergy     Anxiety     Fibromyalgia     Recurrent upper respiratory infection (URI)      Allergies reported: Review of patient's allergies indicates:  No Known Allergies      LOC: Patient is awake, alert, and aware of environment with an appropriate affect. Patient is oriented x 4 and speaking appropriately.  SKIN: The skin is warm and dry. Patient has normal skin turgor and moist mucus membranes.   MUSKULOSKELETAL: Patient is moving all extremities well, no obvious deformities noted. Pulses intact.   RESPIRATORY: Airway is open and patent. Respirations are spontaneous and non-labored with normal effort and rate. +SOB  CARDIAC: Patient has a normal rate and rhythm. Normal sinus on cardiac monitor. No peripheral edema noted. +chest pain  ABDOMEN: No distention noted. Soft and non-tender upon palpation.  NEUROLOGICAL: , PERRL. Facial expression is symmetrical. Hand grasps are equal bilaterally. Normal sensation in all extremities when touched with finger.

## 2024-11-02 NOTE — Clinical Note
"Saul Roque" Daria was seen and treated in our emergency department on 11/2/2024.  She may return to work on 11/05/2024.       If you have any questions or concerns, please don't hesitate to call.      DR Casas/ MALIK Oliva    "

## 2024-11-03 LAB
OHS QRS DURATION: 88 MS
OHS QTC CALCULATION: 440 MS

## 2025-01-27 ENCOUNTER — HOSPITAL ENCOUNTER (EMERGENCY)
Facility: HOSPITAL | Age: 30
Discharge: HOME | End: 2025-01-27
Attending: EMERGENCY MEDICINE
Payer: MEDICAID

## 2025-01-27 VITALS
WEIGHT: 293 LBS | BODY MASS INDEX: 48.82 KG/M2 | RESPIRATION RATE: 18 BRPM | DIASTOLIC BLOOD PRESSURE: 58 MMHG | HEART RATE: 77 BPM | TEMPERATURE: 98 F | SYSTOLIC BLOOD PRESSURE: 104 MMHG | HEIGHT: 65 IN | OXYGEN SATURATION: 98 %

## 2025-01-27 DIAGNOSIS — S39.012A STRAIN OF LUMBAR REGION, INITIAL ENCOUNTER: Primary | ICD-10-CM

## 2025-01-27 LAB
B-HCG UR QL: NEGATIVE
BILIRUB UR QL STRIP: NEGATIVE
CLARITY UR REFRACT.AUTO: CLEAR
COLOR UR AUTO: YELLOW
CTP QC/QA: YES
GLUCOSE UR QL STRIP: NEGATIVE
HGB UR QL STRIP: NEGATIVE
KETONES UR QL STRIP: NEGATIVE
LEUKOCYTE ESTERASE UR QL STRIP: ABNORMAL
MICROSCOPIC COMMENT: NORMAL
NITRITE UR QL STRIP: NEGATIVE
PH UR STRIP: 6 [PH] (ref 5–8)
PROT UR QL STRIP: NEGATIVE
RBC #/AREA URNS AUTO: 0 /HPF (ref 0–4)
SP GR UR STRIP: 1.03 (ref 1–1.03)
SQUAMOUS #/AREA URNS AUTO: 1 /HPF
URN SPEC COLLECT METH UR: ABNORMAL
WBC #/AREA URNS AUTO: 3 /HPF (ref 0–5)

## 2025-01-27 PROCEDURE — 63600175 PHARM REV CODE 636 W HCPCS: Performed by: NURSE PRACTITIONER

## 2025-01-27 PROCEDURE — 96372 THER/PROPH/DIAG INJ SC/IM: CPT | Performed by: NURSE PRACTITIONER

## 2025-01-27 PROCEDURE — 99284 EMERGENCY DEPT VISIT MOD MDM: CPT | Mod: 25

## 2025-01-27 PROCEDURE — 81001 URINALYSIS AUTO W/SCOPE: CPT | Performed by: NURSE PRACTITIONER

## 2025-01-27 PROCEDURE — 81025 URINE PREGNANCY TEST: CPT | Performed by: NURSE PRACTITIONER

## 2025-01-27 RX ORDER — IBUPROFEN 800 MG/1
800 TABLET ORAL 3 TIMES DAILY
Qty: 30 TABLET | Refills: 0 | Status: SHIPPED | OUTPATIENT
Start: 2025-01-27 | End: 2025-02-06

## 2025-01-27 RX ORDER — DEXAMETHASONE SODIUM PHOSPHATE 10 MG/ML
10 INJECTION INTRAMUSCULAR; INTRAVENOUS
Status: COMPLETED | OUTPATIENT
Start: 2025-01-27 | End: 2025-01-27

## 2025-01-27 RX ORDER — METHOCARBAMOL 500 MG/1
500 TABLET, FILM COATED ORAL 4 TIMES DAILY
Qty: 40 TABLET | Refills: 0 | Status: SHIPPED | OUTPATIENT
Start: 2025-01-27 | End: 2025-02-06

## 2025-01-27 RX ADMIN — DEXAMETHASONE SODIUM PHOSPHATE 10 MG: 10 INJECTION INTRAMUSCULAR; INTRAVENOUS at 08:01

## 2025-01-27 NOTE — ED NOTES
"Patient comes into the emergency department by POV with complaints of back pain. Patient states that just got over a cold, started experiencing R sided flank pain she feels worsens when she coughs. Also endorsing a "weird" feeling in vaginal area, states prone to UTI but hasn't seen OBGYN in 3 years.      LOC: The patient is awake, alert and aware of environment with an appropriate affect, the patient is oriented x 3 and speaking appropriately.   APPEARANCE: Patient appears comfortable and in no acute distress, patient is clean and well groomed.  SKIN: The skin is warm and dry, color consistent with ethnicity, patient has normal skin turgor and moist mucus membranes, skin intact, no breakdown or bruising noted.   MUSCULOSKELETAL: Patient moving all extremities spontaneously, no swelling noted.  RESPIRATORY: Airway is open and patent, respirations are spontaneous, patient has a normal effort and rate, no accessory muscle. Endorsing a dry cough.  CARDIAC: Pt placed on cardiac monitor. Patient has a normal rate and regular rhythm, no edema noted, capillary refill < 3 seconds.   GASTRO: Soft and non tender to palpation, no distention noted.  : Pt denies any pain or frequency with urination. Endorsing R sided flank pain.  NEURO: Pt opens eyes spontaneously, behavior appropriate to situation, follows commands, facial expression symmetrical, bilateral hand grasp equal and even, purposeful motor response noted, normal sensation in all extremities when touched with a finger.        "

## 2025-01-27 NOTE — ED PROVIDER NOTES
Encounter Date: 1/27/2025       History     Chief Complaint   Patient presents with    Back Pain     Lower back pain, denies bowel/bladder incontinence     28 y/o female with fibromyalgia which presents to the ED with mid lower back pain that began Saturday. Pt states that she started her menstrual cycle on Friday and thought the pain was related to her cramps. Her cycle ended and the pain continued. She woke up this morning with continued pain and was unable to go to work. Recent viral uri but states she is feeling much better from her cold. Denies urinary or bowel incontinence.     The history is provided by the patient.     Review of patient's allergies indicates:   Allergen Reactions    Iodinated contrast media Nausea And Vomiting     Past Medical History:   Diagnosis Date    Allergy     Anxiety     Fibromyalgia     Recurrent upper respiratory infection (URI)      Past Surgical History:   Procedure Laterality Date    SINUS SURGERY       Family History   Problem Relation Name Age of Onset    Hypertension Mother          sleep apnea    Diabetes Father          schizophrenia, bipolar, hypertension, chf    Lupus Sister          MS    Uterine cancer Maternal Grandmother      Alcohol abuse Maternal Grandfather      Ovarian cancer Paternal Grandmother      Schizophrenia Paternal Grandfather      Breast cancer Neg Hx      Colon cancer Neg Hx      Allergic rhinitis Neg Hx      Allergies Neg Hx      Angioedema Neg Hx      Asthma Neg Hx      Atopy Neg Hx      Eczema Neg Hx      Immunodeficiency Neg Hx      Rhinitis Neg Hx      Urticaria Neg Hx       Social History     Tobacco Use    Smoking status: Every Day     Current packs/day: 0.50     Types: Cigarettes    Smokeless tobacco: Never   Substance Use Topics    Alcohol use: No    Drug use: No     Review of Systems   Constitutional:  Negative for fever.   HENT:  Negative for sore throat.    Respiratory:  Negative for shortness of breath.    Cardiovascular:  Negative for  chest pain.   Gastrointestinal:  Negative for nausea.   Genitourinary:  Negative for dysuria.   Musculoskeletal:  Positive for back pain.   Skin:  Negative for rash.   Neurological:  Negative for weakness.   Hematological:  Does not bruise/bleed easily.   All other systems reviewed and are negative.      Physical Exam     Initial Vitals [01/27/25 0721]   BP Pulse Resp Temp SpO2   127/62 95 20 97.7 °F (36.5 °C) 97 %      MAP       --         Physical Exam    Nursing note and vitals reviewed.  Constitutional: She appears well-developed and well-nourished.   HENT:   Head: Normocephalic and atraumatic.   Eyes: Conjunctivae and EOM are normal. Pupils are equal, round, and reactive to light.   Neck:   Normal range of motion.  Cardiovascular:  Normal rate, regular rhythm, normal heart sounds and intact distal pulses.     Exam reveals no gallop and no friction rub.       No murmur heard.  Pulmonary/Chest: Breath sounds normal. No respiratory distress. She has no wheezes. She has no rhonchi. She has no rales. She exhibits no tenderness.   Abdominal: Abdomen is soft. Bowel sounds are normal. She exhibits no distension and no mass. There is no abdominal tenderness. There is no rebound and no guarding.   Musculoskeletal:         General: No edema. Normal range of motion.      Cervical back: Normal range of motion.      Thoracic back: Normal.      Lumbar back: Spasms and tenderness present. No bony tenderness. Normal range of motion. Negative right straight leg raise test and negative left straight leg raise test.     Neurological: She is alert and oriented to person, place, and time. She has normal strength. GCS score is 15. GCS eye subscore is 4. GCS verbal subscore is 5. GCS motor subscore is 6.   Skin: Skin is warm. Capillary refill takes less than 2 seconds. No rash noted. No erythema.   Psychiatric: She has a normal mood and affect.       ED Course   Procedures  Labs Reviewed   URINALYSIS, REFLEX TO URINE CULTURE -  Abnormal       Result Value    Specimen UA Urine, Clean Catch      Color, UA Yellow      Appearance, UA Clear      pH, UA 6.0      Specific Gravity, UA 1.030      Protein, UA Negative      Glucose, UA Negative      Ketones, UA Negative      Bilirubin (UA) Negative      Occult Blood UA Negative      Nitrite, UA Negative      Leukocytes, UA Trace (*)     Narrative:     Specimen Source->Urine   URINALYSIS MICROSCOPIC    RBC, UA 0      WBC, UA 3      Squam Epithel, UA 1      Microscopic Comment SEE COMMENT      Narrative:     Specimen Source->Urine   HEPATITIS C ANTIBODY   HIV 1 / 2 ANTIBODY   POCT URINE PREGNANCY    POC Preg Test, Ur Negative       Acceptable Yes            Imaging Results    None          Medications   dexAMETHasone sodium phos (PF) injection 10 mg (has no administration in time range)     Medical Decision Making  28 y/o female which presents to the ED with lower back pain that began Saturday. No evidence of cauda equina on exam or history. Urinalysis unremarkable. Pt will be treated for a lumbar strain. She was given decadron in the ED and discharged with ibuprofen and robaxin. Patient given strict return precautions and voiced understanding of all discharge instructions. Pt was stable at discharge.       Differential Diagnosis: cauda equina, UTI, pyelonephritis, lumbar strain, sciatica    Problems Addressed:  Strain of lumbar region, initial encounter: acute illness or injury    Amount and/or Complexity of Data Reviewed  Labs: ordered. Decision-making details documented in ED Course.    Risk  Prescription drug management.               ED Course as of 01/27/25 0821   Mon Jan 27, 2025   0743 hCG Qualitative, Urine: Negative [AT]   0743 BP: 127/62 [AT]   0743 Temp: 97.7 °F (36.5 °C) [AT]   0743 Temp Source: Oral [AT]   0743 Pulse: 95 [AT]   0743 Resp: 20 [AT]   0743 SpO2: 97 % [AT]   0811 Leukocyte Esterase, UA(!): Trace [AT]   0812 WBC, UA: 3 [AT]      ED Course User Index  [AT]  Julienne Garcia FNP                           Clinical Impression:  Final diagnoses:  [S39.012A] Strain of lumbar region, initial encounter (Primary)          ED Disposition Condition    Discharge Stable          ED Prescriptions       Medication Sig Dispense Start Date End Date Auth. Provider    ibuprofen (ADVIL,MOTRIN) 800 MG tablet Take 1 tablet (800 mg total) by mouth 3 (three) times daily. for 10 days 30 tablet 1/27/2025 2/6/2025 Julienne Garcia FNP    methocarbamoL (ROBAXIN) 500 MG Tab Take 1 tablet (500 mg total) by mouth 4 (four) times daily. for 10 days 40 tablet 1/27/2025 2/6/2025 Julienne Garcia FNP          Follow-up Information       Follow up With Specialties Details Why Contact Info    Edith Chavez MD Internal Medicine Schedule an appointment as soon as possible for a visit  As needed [address] [address]  [phone]               Julienne Garcia FNP  01/27/25 0827

## 2025-01-27 NOTE — Clinical Note
"Saul Roque" Daria was seen and treated in our emergency department on 1/27/2025.  She may return to work on 01/28/2025.       If you have any questions or concerns, please don't hesitate to call.      Julienne Garcia, NAVDEEP"

## 2025-01-27 NOTE — DISCHARGE INSTRUCTIONS

## 2025-03-06 ENCOUNTER — OFFICE VISIT (OUTPATIENT)
Dept: URGENT CARE | Facility: CLINIC | Age: 30
End: 2025-03-06
Payer: MEDICAID

## 2025-03-06 VITALS
BODY MASS INDEX: 48.82 KG/M2 | WEIGHT: 293 LBS | OXYGEN SATURATION: 98 % | HEIGHT: 65 IN | DIASTOLIC BLOOD PRESSURE: 73 MMHG | RESPIRATION RATE: 20 BRPM | TEMPERATURE: 98 F | HEART RATE: 83 BPM | SYSTOLIC BLOOD PRESSURE: 113 MMHG

## 2025-03-06 DIAGNOSIS — R51.9 ACUTE NONINTRACTABLE HEADACHE, UNSPECIFIED HEADACHE TYPE: ICD-10-CM

## 2025-03-06 DIAGNOSIS — J02.9 SORE THROAT: ICD-10-CM

## 2025-03-06 DIAGNOSIS — R09.81 NASAL CONGESTION WITH RHINORRHEA: ICD-10-CM

## 2025-03-06 DIAGNOSIS — J06.9 VIRAL URI WITH COUGH: Primary | ICD-10-CM

## 2025-03-06 DIAGNOSIS — J34.89 NASAL CONGESTION WITH RHINORRHEA: ICD-10-CM

## 2025-03-06 PROBLEM — F41.1 GENERALIZED ANXIETY DISORDER: Status: ACTIVE | Noted: 2022-06-10

## 2025-03-06 PROBLEM — F41.9 ANXIETY: Status: ACTIVE | Noted: 2019-01-06

## 2025-03-06 PROBLEM — E55.9 VITAMIN D DEFICIENCY: Status: ACTIVE | Noted: 2019-06-05

## 2025-03-06 PROBLEM — F33.0 MAJOR DEPRESSIVE DISORDER, RECURRENT EPISODE, MILD: Status: ACTIVE | Noted: 2022-06-10

## 2025-03-06 PROBLEM — K59.09 OTHER CONSTIPATION: Status: ACTIVE | Noted: 2022-06-10

## 2025-03-06 PROBLEM — F32.A DEPRESSIVE DISORDER: Status: ACTIVE | Noted: 2019-01-06

## 2025-03-06 PROBLEM — M79.7 FIBROMYALGIA: Status: ACTIVE | Noted: 2019-06-05

## 2025-03-06 PROBLEM — G47.00 INSOMNIA: Status: ACTIVE | Noted: 2019-01-06

## 2025-03-06 LAB
CTP QC/QA: YES
MOLECULAR STREP A: NEGATIVE
POC MOLECULAR INFLUENZA A AGN: NEGATIVE
POC MOLECULAR INFLUENZA B AGN: NEGATIVE
SARS CORONAVIRUS 2 ANTIGEN: NEGATIVE

## 2025-03-06 RX ORDER — FLUTICASONE PROPIONATE 50 MCG
2 SPRAY, SUSPENSION (ML) NASAL DAILY PRN
Qty: 15.8 ML | Refills: 0 | Status: SHIPPED | OUTPATIENT
Start: 2025-03-06 | End: 2025-04-05

## 2025-03-06 RX ORDER — PROMETHAZINE HYDROCHLORIDE AND DEXTROMETHORPHAN HYDROBROMIDE 6.25; 15 MG/5ML; MG/5ML
5 SYRUP ORAL NIGHTLY PRN
Qty: 118 ML | Refills: 0 | Status: SHIPPED | OUTPATIENT
Start: 2025-03-06 | End: 2025-03-30

## 2025-03-06 RX ORDER — KETOROLAC TROMETHAMINE 30 MG/ML
30 INJECTION, SOLUTION INTRAMUSCULAR; INTRAVENOUS
Status: COMPLETED | OUTPATIENT
Start: 2025-03-06 | End: 2025-03-06

## 2025-03-06 RX ORDER — LORATADINE 10 MG/1
10 TABLET ORAL DAILY PRN
Qty: 30 TABLET | Refills: 0 | Status: SHIPPED | OUTPATIENT
Start: 2025-03-06 | End: 2025-04-05

## 2025-03-06 RX ORDER — IBUPROFEN 800 MG/1
800 TABLET ORAL EVERY 6 HOURS PRN
Qty: 30 TABLET | Refills: 0 | Status: SHIPPED | OUTPATIENT
Start: 2025-03-07 | End: 2025-03-17

## 2025-03-06 RX ORDER — GUAIFENESIN AND DEXTROMETHORPHAN HYDROBROMIDE 1200; 60 MG/1; MG/1
1 TABLET, EXTENDED RELEASE ORAL EVERY 12 HOURS PRN
Qty: 20 TABLET | Refills: 0 | Status: SHIPPED | OUTPATIENT
Start: 2025-03-06 | End: 2025-03-16

## 2025-03-06 RX ADMIN — KETOROLAC TROMETHAMINE 30 MG: 30 INJECTION, SOLUTION INTRAMUSCULAR; INTRAVENOUS at 01:03

## 2025-03-06 NOTE — LETTER
"  March 6, 2025      [facility] [place]  [place] [address]  Phone: [phone]  Fax: [phone]       Patient: Saul Huff   YOB: 1995  Date of Visit: 03/06/2025    To Whom It May Concern:    Shin Huff  was at [facility] on 03/06/2025. The patient may return to work/school on 3/7/25 with no restrictions. If you have any questions or concerns, or if I can be of further assistance, please do not hesitate to contact me.    Sincerely,        Gisele Wheat PA-C (Jackie)       "

## 2025-03-06 NOTE — PATIENT INSTRUCTIONS
Discussed with patient that if he/she is in pain today, only take tylenol due to toradol injection received in clinic. You can continue NSAIDS tomorrow such as ibuprofen (Motrin/Advil), naproxen (Aleve), Mobic (Meloxicam).       Recommend oral antihistamine (claritin, zyrtec, allegra,xyzal),oral decongestant (pseudoephedrine)  for rhinorrhea/ear congestion <3 days if blood pressure is <130/80mmhg, steroid nasal spray (flonase) +/- ), prescription (promethazine-DM) at night due to drowsiness  /OTC cough medicine (Mucinex DM 12 hour,  Tylenol (Acetaminophen) and/or Motrin (Ibuprofen) as directed for control of pain and/or fever.      Please drink plenty of fluids.  Please get plenty of rest.  Nasal irrigation with a saline spray or Netti Pot like device per their directions is also recommended.  If you  smoke, please stop smoking.    To help ease a sore throat, you can:  Use a sore throat spray.  Suck on hard candy or throat lozenges.  Gargle with warm saltwater a few times each day. Mix of 1/4 teaspoon (1.25 grams) salt in 8 ounces (240 mL) of warm water.  Use a cool mist humidifier to help you breathe easier.    If you negative (-) for a COVID test today and you are continuing to have symptoms, it is recommended to repeat the test in 48 hours x 3. If you continue to be negative, you may return to school/work once you have improved symptoms and no fever for 24 hours without any medications. This applies to all viral illnesses.       Discussed prescriptions and over-the-counter medicines to help with patient's symptoms:  A steroid nose spray (flonase) and antihistamine nasal spray (azelastine) can help with a stuffy nose. It can also help with drainage down the back of your throat.  An antihistamine (loratadine,zyrtec,allegra, xyzal) can help with itching, sneezing, or runny nose.  An antihistamine eye drop can help with itchy eyes.  A decongestant (pseudoephedrine,  Phenylephrine, oxymetazoline aka afrin nasal spray)  can help with a stuffy nose. Take <10 days for congestion and rhinorrhea. Once symptoms improve, proceed with loratadine/zyrtec once a day. These ingredients can keep you up all night, decrease appetite, feel jittery, and raise blood pressure with long term use.  Medications that control cough are suppressants and expectorants. Suppressants are tessalon pearls and dextromethorphan. If you have a productive cough with sputum, you need an expectorant called guaifenesin. Dextromethorphan and Guaifenesin are active ingredients in many OTC cough/cold medications such as Dayquil/Nyquil, Mucinex, and Robitussin Mucus+Chest Congestion.            Common Cold Medicine Ingredients Cheat sheet  Acetaminophen (APAP) -pain reliever/fever reducer  Dextromethorphan - cough suppressant  Guaifenesin - expectorant/thins and loosens mucus  Phenylephrine - nasal decongestant  Diphenhydramine or Doxylamine succinate - antihistamine, helps you fall asleep  Promethazine or Brompheniramine - Prescription strength antihistamines    These OTC cold medications are safe to use if you do not have high blood pressure (hypertension) or palpitations.  DayQuil and NyQuil - Cough, Cold & Flu Relief LiquiCaps  DayQuil: Acetaminophen, Dextromethorphan, and Phenylephrine   NyQuil: Acetaminophen, Dextromethorphan, and Doxylamine  DayQuil and NyQuil SEVERE Maximum Strength Cough, Cold & Flu Relief LiquiCaps   DAYQUIL: Acetaminophen, Dextromethorphan, Guaifenesin, and Phenylephrine  NYQUIL: Acetaminophen, Dextromethorphan, Doxylamine, and Phenylephrine   Mucinex DM: Guaifenesin,Dextromethorphan  Mucinex Maximum Strength Sinus-Max® Pressure, Pain & Cough Liquid Gels: Acetaminophen/Dextromethorphan/ Guaifenesin/Phenylephrine       If not allergic, take Tylenol (Acetaminophen) 650 mg to  1 g every 6 hours as needed  and/or Motrin (Ibuprofen) 600 to 800 mg every 6 hours as needed for fever or pain.          Please remember that you have received care at an  urgent care today. Urgent cares are not emergency rooms and are not equipped to handle life threatening emergencies and cannot rule in or out certain medical conditions and you may be released before all of your medical problems are known or treated.     Please arrange follow up with your primary care physician or speciality clinic within 2-5 days if your signs and symptoms have not resolved or worsen.     Patient can call our Referral Hotline at [phone] to make an appointment.      Please return here or go to the Emergency Department for any concerns or worsening of condition.  Signs of infection. These include a fever of 100.4°F (38°C) or higher, chills, cough, more sputum or change in color of sputum.  You are having so much trouble breathing that you can only say one or two words at a time.  You need to sit upright at all times to be able to breathe and or cannot lie down.  You have trouble breathing when talking or sitting still.  You have a fever of 100.4°F (38°C) or higher or chills.  You have chest pain when you cough, have trouble breathing but can still talk in full sentences, or cough up blood.

## 2025-03-06 NOTE — PROGRESS NOTES
"Subjective:      Patient ID: Saul Huff is a 29 y.o. female.    Vitals:  height is 5' 5" (1.651 m) and weight is 150 kg (330 lb 11 oz) (abnormal). Her oral temperature is 98 °F (36.7 °C). Her blood pressure is 113/73 and her pulse is 83. Her respiration is 20 and oxygen saturation is 98%.     Chief Complaint: Sinus Problem    Saul Huff is a 29 y.o. female with allergic rhinitis, anxiety, and fibromyalgia who complains of cough, headaches, fever, chills, body aches, congestion, runny nose. Sx started 5 days ago. Tx include OTC cold and flu (Mucinex fast max, miriam seltzer gels, nyquil).        Provider HPI  Patient is crying in room due to sinus headache. Pain rated 8/10.  Pt c/o mucus in cough;  not being to sleep. Reports sore throat; rated 8/10;  drinking tea to help with pain.     Sinus Problem  This is a recurrent problem. The current episode started in the past 7 days. The problem has been gradually worsening since onset. Her pain is at a severity of 7/10. The pain is severe. Associated symptoms include chills, congestion, coughing, headaches, sinus pressure, sneezing and a sore throat. Pertinent negatives include no diaphoresis, ear pain, hoarse voice, neck pain, shortness of breath or swollen glands. The treatment provided mild relief.     Constitution: Positive for activity change, chills, fatigue, fever and generalized weakness. Negative for sweating.   HENT:  Positive for congestion, postnasal drip, sinus pain, sinus pressure, sore throat and trouble swallowing. Negative for ear pain, ear discharge, foreign body in ear, tinnitus and voice change.    Neck: Negative for neck pain.   Cardiovascular:  Negative for chest pain, leg swelling, palpitations and sob on exertion.   Respiratory:  Positive for cough and sputum production. Negative for sleep apnea, chest tightness, shortness of breath, wheezing and asthma.    Gastrointestinal:  Negative for nausea and vomiting.   Musculoskeletal:  Positive " for pain and muscle ache.   Allergic/Immunologic: Positive for environmental allergies, seasonal allergies, recurrent sinus infections and sneezing. Negative for food allergies and asthma.   Neurological:  Positive for headaches. Negative for history of migraines.   Psychiatric/Behavioral:  Positive for nervous/anxious. The patient is nervous/anxious.       Objective:     Physical Exam   Constitutional: She is oriented to person, place, and time. She is cooperative. No distress.      Comments:Patient is awake and alert, sitting up in exam chair, speaking and answering in complete sentences     normalawake  HENT:   Head: Normocephalic and atraumatic.      Comments: Patient observed breathing through mouth, sniffling, and frequently clearing throat.    Ears:   Right Ear: External ear and ear canal normal. A middle ear effusion is present.   Left Ear: External ear and ear canal normal. A middle ear effusion is present.   Nose: Mucosal edema, rhinorrhea and congestion present.   Mouth/Throat: Uvula is midline and mucous membranes are normal. Mucous membranes are moist. Posterior oropharyngeal edema and posterior oropharyngeal erythema present. No oropharyngeal exudate. Tonsils are 2+ on the right. Tonsils are 2+ on the left. No tonsillar exudate. Oropharynx is clear.      Comments:  postnasal discharge noted on the posterior pharyngeal wall    Eyes: Conjunctivae and lids are normal. Pupils are equal, round, and reactive to light. Extraocular movement intact vision grossly intact gaze aligned appropriately   Neck: Neck supple.   Cardiovascular: Normal rate, regular rhythm, normal heart sounds and normal pulses.   Pulmonary/Chest: Effort normal and breath sounds normal. No respiratory distress. She has no wheezes. She has no rhonchi. She has no rales.   Abdominal: Normal appearance.   Musculoskeletal: Normal range of motion.         General: Normal range of motion.      Cervical back: She exhibits no tenderness.    Lymphadenopathy:     She has no cervical adenopathy.   Neurological: She is alert and oriented to person, place, and time.   Skin: Skin is warm.   Nursing note and vitals reviewed.      Assessment:     1. Viral URI with cough    2. Nasal congestion with rhinorrhea    3. Acute nonintractable headache, unspecified headache type    4. Sore throat      Patient presents with clinical exam findings and history consistent with above.      On exam, patient is nontoxic appearing and vitals are stable.      Diagnostic testing results were reviewed and discussed with patient/guardian.   Tests ordered in clinic:  Results for orders placed or performed in visit on 03/06/25   POCT Influenza A/B MOLECULAR    Collection Time: 03/06/25  1:09 PM   Result Value Ref Range    POC Molecular Influenza A Ag Negative Negative    POC Molecular Influenza B Ag Negative Negative     Acceptable Yes    SARS Coronavirus 2 Antigen, POCT Manual Read    Collection Time: 03/06/25  1:51 PM   Result Value Ref Range    SARS Coronavirus 2 Antigen Negative Negative, Presumptive Negative     Acceptable Yes    POCT Strep A, Molecular    Collection Time: 03/06/25  1:59 PM   Result Value Ref Range    Molecular Strep A, POC Negative Negative     Acceptable Yes        Previous progress notes/admissions/labs and medications were reviewed.  Reviewed GFR > 60 from CMP on 11/02/24.    Plan:   Patient states headache is improving with toradol injection.       Viral URI with cough  -     POCT Influenza A/B MOLECULAR  -     SARS Coronavirus 2 Antigen, POCT Manual Read  -     promethazine-dextromethorphan (PROMETHAZINE-DM) 6.25-15 mg/5 mL Syrp; Take 5 mLs by mouth nightly as needed (cough).  Dispense: 118 mL; Refill: 0  -     dextromethorphan-guaiFENesin (MUCINEX DM) 60-1,200 mg per 12 hr tablet; Take 1 tablet by mouth every 12 (twelve) hours as needed (productive cough).  Dispense: 20 tablet; Refill: 0  -     Ambulatory  referral/consult to Internal Medicine    Nasal congestion with rhinorrhea  -     fluticasone propionate (FLONASE) 50 mcg/actuation nasal spray; 2 sprays (100 mcg total) by Each Nostril route daily as needed for Allergies or Rhinitis.  Dispense: 15.8 mL; Refill: 0  -     loratadine (CLARITIN) 10 mg tablet; Take 1 tablet (10 mg total) by mouth daily as needed for Allergies (Rhinitis, Congestion).  Dispense: 30 tablet; Refill: 0    Acute nonintractable headache, unspecified headache type  -     ketorolac injection 30 mg  -     ibuprofen (ADVIL,MOTRIN) 800 MG tablet; Take 1 tablet (800 mg total) by mouth every 6 (six) hours as needed for Pain.  Dispense: 30 tablet; Refill: 0    Sore throat  -     POCT Strep A, Molecular  -     fluticasone propionate (FLONASE) 50 mcg/actuation nasal spray; 2 sprays (100 mcg total) by Each Nostril route daily as needed for Allergies or Rhinitis.  Dispense: 15.8 mL; Refill: 0  -     loratadine (CLARITIN) 10 mg tablet; Take 1 tablet (10 mg total) by mouth daily as needed for Allergies (Rhinitis, Congestion).  Dispense: 30 tablet; Refill: 0                    1) See orders for this visit as documented in the electronic medical record.  2) Symptomatic therapy suggested: use acetaminophen/ibuprofen every 6-8 hours prn pain or fever, push fluids.   3) Call or return to clinic prn if these symptoms worsen or fail to improve as anticipated.    Discussed results/diagnosis/plan with patient in clinic.  We had shared decision making for patient's treatment. Patient verbalized understanding and in agreement with current treatment plan.     Patient was instructed to return for re-evaluation with urgent care or PCP for continued outpatient workup and management if symptoms do not improve/worsening symptoms. Strict ED versus clinic precautions given in depth.    Discharge and follow-up instructions given verbally/printed with the patient who expressed understanding. The instructions and results are also  "available on [facility].              [facility] "[facility]" JENNIFER Wheat          Patient Instructions   Discussed with patient that if he/she is in pain today, only take tylenol due to toradol injection received in clinic. You can continue NSAIDS tomorrow such as ibuprofen (Motrin/Advil), naproxen (Aleve), Mobic (Meloxicam).       Recommend oral antihistamine (claritin, zyrtec, allegra,xyzal),oral decongestant (pseudoephedrine)  for rhinorrhea/ear congestion <3 days if blood pressure is <130/80mmhg, steroid nasal spray (flonase) +/- ), prescription (promethazine-DM) at night due to drowsiness  /OTC cough medicine (Mucinex DM 12 hour,  Tylenol (Acetaminophen) and/or Motrin (Ibuprofen) as directed for control of pain and/or fever.      Please drink plenty of fluids.  Please get plenty of rest.  Nasal irrigation with a saline spray or Netti Pot like device per their directions is also recommended.  If you  smoke, please stop smoking.    To help ease a sore throat, you can:  Use a sore throat spray.  Suck on hard candy or throat lozenges.  Gargle with warm saltwater a few times each day. Mix of 1/4 teaspoon (1.25 grams) salt in 8 ounces (240 mL) of warm water.  Use a cool mist humidifier to help you breathe easier.    If you negative (-) for a COVID test today and you are continuing to have symptoms, it is recommended to repeat the test in 48 hours x 3. If you continue to be negative, you may return to school/work once you have improved symptoms and no fever for 24 hours without any medications. This applies to all viral illnesses.       Discussed prescriptions and over-the-counter medicines to help with patient's symptoms:  A steroid nose spray (flonase) and antihistamine nasal spray (azelastine) can help with a stuffy nose. It can also help with drainage down the back of your throat.  An antihistamine (loratadine,zyrtec,allegra, xyzal) can help with itching, sneezing, or runny nose.  An antihistamine eye drop can help with " itchy eyes.  A decongestant (pseudoephedrine,  Phenylephrine, oxymetazoline aka afrin nasal spray) can help with a stuffy nose. Take <10 days for congestion and rhinorrhea. Once symptoms improve, proceed with loratadine/zyrtec once a day. These ingredients can keep you up all night, decrease appetite, feel jittery, and raise blood pressure with long term use.  Medications that control cough are suppressants and expectorants. Suppressants are tessalon pearls and dextromethorphan. If you have a productive cough with sputum, you need an expectorant called guaifenesin. Dextromethorphan and Guaifenesin are active ingredients in many OTC cough/cold medications such as Dayquil/Nyquil, Mucinex, and Robitussin Mucus+Chest Congestion.            Common Cold Medicine Ingredients Cheat sheet  Acetaminophen (APAP) -pain reliever/fever reducer  Dextromethorphan - cough suppressant  Guaifenesin - expectorant/thins and loosens mucus  Phenylephrine - nasal decongestant  Diphenhydramine or Doxylamine succinate - antihistamine, helps you fall asleep  Promethazine or Brompheniramine - Prescription strength antihistamines    These OTC cold medications are safe to use if you do not have high blood pressure (hypertension) or palpitations.  DayQuil and NyQuil - Cough, Cold & Flu Relief LiquiCaps  DayQuil: Acetaminophen, Dextromethorphan, and Phenylephrine   NyQuil: Acetaminophen, Dextromethorphan, and Doxylamine  DayQuil and NyQuil SEVERE Maximum Strength Cough, Cold & Flu Relief LiquiCaps   DAYQUIL: Acetaminophen, Dextromethorphan, Guaifenesin, and Phenylephrine  NYQUIL: Acetaminophen, Dextromethorphan, Doxylamine, and Phenylephrine   Mucinex DM: Guaifenesin,Dextromethorphan  Mucinex Maximum Strength Sinus-Max® Pressure, Pain & Cough Liquid Gels: Acetaminophen/Dextromethorphan/ Guaifenesin/Phenylephrine       If not allergic, take Tylenol (Acetaminophen) 650 mg to  1 g every 6 hours as needed  and/or Motrin (Ibuprofen) 600 to 800 mg every  6 hours as needed for fever or pain.          Please remember that you have received care at an urgent care today. Urgent cares are not emergency rooms and are not equipped to handle life threatening emergencies and cannot rule in or out certain medical conditions and you may be released before all of your medical problems are known or treated.     Please arrange follow up with your primary care physician or speciality clinic within 2-5 days if your signs and symptoms have not resolved or worsen.     Patient can call our Referral Hotline at [phone] to make an appointment.      Please return here or go to the Emergency Department for any concerns or worsening of condition.  Signs of infection. These include a fever of 100.4°F (38°C) or higher, chills, cough, more sputum or change in color of sputum.  You are having so much trouble breathing that you can only say one or two words at a time.  You need to sit upright at all times to be able to breathe and or cannot lie down.  You have trouble breathing when talking or sitting still.  You have a fever of 100.4°F (38°C) or higher or chills.  You have chest pain when you cough, have trouble breathing but can still talk in full sentences, or cough up blood.

## 2025-03-27 ENCOUNTER — OFFICE VISIT (OUTPATIENT)
Dept: PRIMARY CARE CLINIC | Facility: CLINIC | Age: 30
End: 2025-03-27
Payer: MEDICAID

## 2025-03-27 VITALS
TEMPERATURE: 99 F | BODY MASS INDEX: 59.12 KG/M2 | SYSTOLIC BLOOD PRESSURE: 94 MMHG | DIASTOLIC BLOOD PRESSURE: 68 MMHG | OXYGEN SATURATION: 98 % | WEIGHT: 293 LBS | HEART RATE: 82 BPM

## 2025-03-27 DIAGNOSIS — E66.01 CLASS 3 SEVERE OBESITY DUE TO EXCESS CALORIES WITH SERIOUS COMORBIDITY AND BODY MASS INDEX (BMI) OF 50.0 TO 59.9 IN ADULT: ICD-10-CM

## 2025-03-27 DIAGNOSIS — E66.813 CLASS 3 SEVERE OBESITY DUE TO EXCESS CALORIES WITH SERIOUS COMORBIDITY AND BODY MASS INDEX (BMI) OF 50.0 TO 59.9 IN ADULT: ICD-10-CM

## 2025-03-27 DIAGNOSIS — Z71.6 ENCOUNTER FOR SMOKING CESSATION COUNSELING: ICD-10-CM

## 2025-03-27 DIAGNOSIS — K52.9 GASTROENTERITIS: ICD-10-CM

## 2025-03-27 DIAGNOSIS — M79.7 FIBROMYALGIA: ICD-10-CM

## 2025-03-27 DIAGNOSIS — R79.9 ABNORMAL BLOOD CHEMISTRY: ICD-10-CM

## 2025-03-27 DIAGNOSIS — K21.9 GASTROESOPHAGEAL REFLUX DISEASE WITHOUT ESOPHAGITIS: ICD-10-CM

## 2025-03-27 DIAGNOSIS — F41.1 GENERALIZED ANXIETY DISORDER: ICD-10-CM

## 2025-03-27 DIAGNOSIS — E55.9 VITAMIN D DEFICIENCY: ICD-10-CM

## 2025-03-27 DIAGNOSIS — Z00.00 ENCOUNTER FOR ANNUAL PHYSICAL EXAM: ICD-10-CM

## 2025-03-27 DIAGNOSIS — G47.09 OTHER INSOMNIA: ICD-10-CM

## 2025-03-27 DIAGNOSIS — F33.0 MAJOR DEPRESSIVE DISORDER, RECURRENT EPISODE, MILD: Primary | ICD-10-CM

## 2025-03-27 DIAGNOSIS — Z11.59 NEED FOR HEPATITIS C SCREENING TEST: ICD-10-CM

## 2025-03-27 PROBLEM — N87.9 DYSPLASIA OF CERVIX: Status: ACTIVE | Noted: 2022-02-11

## 2025-03-27 PROBLEM — I10 ELEVATED BLOOD PRESSURE READING IN OFFICE WITH DIAGNOSIS OF HYPERTENSION: Status: ACTIVE | Noted: 2022-02-01

## 2025-03-27 PROBLEM — R76.8 ANA POSITIVE: Status: ACTIVE | Noted: 2019-06-05

## 2025-03-27 PROBLEM — R51.9 PERSISTENT HEADACHES: Status: ACTIVE | Noted: 2019-06-05

## 2025-03-27 PROBLEM — J32.9 CHRONIC CONGESTION OF PARANASAL SINUS: Status: ACTIVE | Noted: 2025-03-20

## 2025-03-27 PROBLEM — R70.0 ELEVATED ERYTHROCYTE SEDIMENTATION RATE: Status: ACTIVE | Noted: 2022-06-10

## 2025-03-27 PROCEDURE — 99215 OFFICE O/P EST HI 40 MIN: CPT | Mod: PBBFAC,PN

## 2025-03-27 PROCEDURE — 3078F DIAST BP <80 MM HG: CPT | Mod: CPTII,,,

## 2025-03-27 PROCEDURE — 3044F HG A1C LEVEL LT 7.0%: CPT | Mod: CPTII,,,

## 2025-03-27 PROCEDURE — 99215 OFFICE O/P EST HI 40 MIN: CPT | Mod: 25,S$PBB,,

## 2025-03-27 PROCEDURE — 3008F BODY MASS INDEX DOCD: CPT | Mod: CPTII,,,

## 2025-03-27 PROCEDURE — 1159F MED LIST DOCD IN RCRD: CPT | Mod: CPTII,,,

## 2025-03-27 PROCEDURE — 99395 PREV VISIT EST AGE 18-39: CPT | Mod: S$PBB,,,

## 2025-03-27 PROCEDURE — 99999 PR PBB SHADOW E&M-EST. PATIENT-LVL V: CPT | Mod: PBBFAC,,,

## 2025-03-27 PROCEDURE — 1160F RVW MEDS BY RX/DR IN RCRD: CPT | Mod: CPTII,,,

## 2025-03-27 PROCEDURE — 3074F SYST BP LT 130 MM HG: CPT | Mod: CPTII,,,

## 2025-03-27 RX ORDER — AMITRIPTYLINE HYDROCHLORIDE 50 MG/1
50 TABLET, FILM COATED ORAL NIGHTLY
Qty: 30 TABLET | Refills: 1 | Status: SHIPPED | OUTPATIENT
Start: 2025-03-27 | End: 2025-05-26

## 2025-03-27 RX ORDER — CYCLOBENZAPRINE HCL 10 MG
10 TABLET ORAL 3 TIMES DAILY PRN
Qty: 90 TABLET | Refills: 11 | Status: SHIPPED | OUTPATIENT
Start: 2025-03-27

## 2025-03-27 RX ORDER — BUTALBITAL, ACETAMINOPHEN AND CAFFEINE 300; 40; 50 MG/1; MG/1; MG/1
CAPSULE ORAL
COMMUNITY

## 2025-03-27 RX ORDER — DESVENLAFAXINE SUCCINATE 100 MG/1
100 TABLET, EXTENDED RELEASE ORAL DAILY
Qty: 30 TABLET | Refills: 0 | Status: SHIPPED | OUTPATIENT
Start: 2025-03-27 | End: 2025-04-26

## 2025-03-27 RX ORDER — HYDROXYZINE PAMOATE 50 MG/1
50 CAPSULE ORAL EVERY 8 HOURS PRN
Qty: 90 CAPSULE | Refills: 1 | Status: SHIPPED | OUTPATIENT
Start: 2025-03-27 | End: 2025-05-26

## 2025-03-27 RX ORDER — PHENIRAMINE MALEATE, PHENYLEPHRINE HCL 17.5; 1 MG/1; MG/1
1 TABLET ORAL EVERY 4 HOURS PRN
COMMUNITY
Start: 2025-03-20

## 2025-03-27 RX ORDER — ARIPIPRAZOLE 5 MG/1
5 TABLET ORAL NIGHTLY
Qty: 30 TABLET | Refills: 0 | Status: SHIPPED | OUTPATIENT
Start: 2025-03-27 | End: 2025-04-26

## 2025-03-27 RX ORDER — DOXYCYCLINE HYCLATE 100 MG
100 TABLET ORAL 2 TIMES DAILY
COMMUNITY
Start: 2024-11-13

## 2025-03-27 RX ORDER — PANTOPRAZOLE SODIUM 40 MG/1
TABLET, DELAYED RELEASE ORAL
COMMUNITY
End: 2025-03-27 | Stop reason: SDUPTHER

## 2025-03-27 RX ORDER — PANTOPRAZOLE SODIUM 40 MG/1
40 TABLET, DELAYED RELEASE ORAL DAILY
Qty: 90 TABLET | Refills: 2 | Status: SHIPPED | OUTPATIENT
Start: 2025-03-27 | End: 2025-12-22

## 2025-03-27 RX ORDER — ONDANSETRON 8 MG/1
8 TABLET, ORALLY DISINTEGRATING ORAL EVERY 8 HOURS PRN
COMMUNITY
Start: 2025-03-20 | End: 2025-03-27

## 2025-03-27 RX ORDER — HYDROXYZINE PAMOATE 50 MG/1
50 CAPSULE ORAL
COMMUNITY
Start: 2024-11-02 | End: 2025-03-27 | Stop reason: SDUPTHER

## 2025-03-27 NOTE — LETTER
March 27, 2025      [facility]  [address] [place] [id number]       Patient: Saul Huff   YOB: 1995  Date of Visit: 03/27/2025    To Whom It May Concern:    Shin Huff  was at [facility] on 03/27/2025. The patient may return to work on 03/28/2025 with no restrictions. If you have any questions or concerns, or if I can be of further assistance, please do not hesitate to contact me.    Sincerely,         NAVDEEP Strange

## 2025-03-27 NOTE — PROGRESS NOTES
"SUBJECTIVE     Chief Complaint   Patient presents with    Establish Care    Medication Refill    Abdominal Pain       HPI  Saul Huff is a 29 y.o. female with multiple medical diagnoses as listed in the medical history and problem list that presents to clinic to establish. Pt is new to me. She has a good appetite and eats well. She does not exercise. She sleeps for ~4 hours nightly broken sleep. Pt does take OTC supplements. She does have any current stressors. Pt is not UTD on age appropriate CA screening. Dental exam is not UTD. Eye exam is not UTD.     HPI    History of Present Illness    CHIEF COMPLAINT:  Patient presents today to establish care    GI CONCERNS:  She reports recent GI issues including diarrhea described as "liquefied" last week. Patient reports she was seen at urgent care. She experienced an episode of abdominal cramping that began with constipation and progressed to diarrhea, accompanied by nausea during bowel movements. She was prescribed Zofran for nausea management. She also reports acid reflux symptoms and requests a refill of her acid reflux medication. GI symptoms are improving.     WEIGHT MANAGEMENT:  She reports weight gain from approximately 340 lbs last year to around 360 lbs currently. She notes an asymptomatic umbilical hernia, currently without bowel complications.    MENTAL HEALTH:  She reports fluctuating depression symptoms, exacerbated by employment difficulties. Her current Pristiq 50mg daily for major depressive disorder has significantly improved symptoms.    SLEEP:  She experiences significant sleep disturbances with multiple nighttime awakenings due to night sweats or hunger. After waking, she remains awake for 2-3 hours. She estimates getting at least 4 hours of broken sleep nightly.    MEDICATIONS:  Current medications include Abilify 5mg nightly, Amitriptyline nightly, Astelin for allergies, Fioricet PRN for migraines, Flexeril for fibromyalgia, Pepcid PRN for " stomach symptoms, Junel FE for birth control, Vistaril PRN nightly for anxiety, Protonix 40mg daily for acid reflux, Pristiq 50mg daily, and Citalopram. She takes vitamin D and probiotic supplements.    FAMILY HISTORY:  Mother had breast cancer with completed treatment at age 63. Sister diagnosed with lupus and multiple sclerosis at age 30. Father had diabetes and cardiac problems requiring a defibrillator, contributing to his death. Both paternal and maternal grandmothers had cancer. Mother had umbilical hernia requiring surgery due to bowel complications.    ALLERGIES:  Allergy to iodine contrast with associated vomiting.    SOCIAL HISTORY:  She currently smokes cigarettes and expresses desire to quit. She uses medical marijuana regularly and occasionally vapes when traveling.    GYNECOLOGIC:  Most recent menstrual period ended Sunday, noting recent periods have been light in flow.    PREVENTIVE CARE:  Last dental cleaning was approximately one year ago. She reports needing vision prescription update.      ROS:  General: -fever, -chills, -fatigue, -weight gain, -weight loss  Eyes: -vision changes, -redness, -discharge  ENT: -ear pain, -nasal congestion, -sore throat  Cardiovascular: -chest pain, -palpitations, -lower extremity edema  Respiratory: -cough, -shortness of breath  Gastrointestinal: -abdominal pain, +nausea, -vomiting, +diarrhea, +constipation, -blood in stool  Genitourinary: -dysuria, -hematuria, -frequency  Musculoskeletal: -joint pain, +muscle pain, +body aches, +pain with movement  Skin: -rash, -lesion  Neurological: -headache, -dizziness, -numbness, -tingling, +night sweats, +sleep disturbances, +difficulty staying asleep, +migraines  Psychiatric: +anxiety, +depression, -sleep difficulty, +increased stressors  Female Genitourinary: +menstrual pain or symptoms         PAST MEDICAL HISTORY:  Past Medical History:   Diagnosis Date    Allergy     Anxiety     Fibromyalgia     Recurrent upper respiratory  infection (URI)        PAST SURGICAL HISTORY:  Past Surgical History:   Procedure Laterality Date    SINUS SURGERY         SOCIAL HISTORY:  Social History[1]    FAMILY HISTORY:  Family History   Problem Relation Name Age of Onset    Hypertension Mother          sleep apnea    Diabetes Father          schizophrenia, bipolar, hypertension, chf    Lupus Sister          MS    Uterine cancer Maternal Grandmother      Alcohol abuse Maternal Grandfather      Ovarian cancer Paternal Grandmother      Schizophrenia Paternal Grandfather      Breast cancer Neg Hx      Colon cancer Neg Hx      Allergic rhinitis Neg Hx      Allergies Neg Hx      Angioedema Neg Hx      Asthma Neg Hx      Atopy Neg Hx      Eczema Neg Hx      Immunodeficiency Neg Hx      Rhinitis Neg Hx      Urticaria Neg Hx         ALLERGIES AND MEDICATIONS: updated and reviewed.  Review of patient's allergies indicates:   Allergen Reactions    Iodinated contrast media Nausea And Vomiting     Current Medications[2]    OBJECTIVE     Physical Exam  Vitals:    03/27/25 0838   BP: 94/68   Pulse: 82   Temp: 98.6 °F (37 °C)    Body mass index is 59.12 kg/m².  Weight: (!) 161.1 kg (355 lb 4.3 oz)         Physical Exam  Vitals reviewed.   Constitutional:       General: She is not in acute distress.  HENT:      Right Ear: External ear normal.      Left Ear: External ear normal.      Nose: Nose normal.      Mouth/Throat:      Mouth: Mucous membranes are moist.   Eyes:      Extraocular Movements: Extraocular movements intact.      Conjunctiva/sclera: Conjunctivae normal.      Pupils: Pupils are equal, round, and reactive to light.   Cardiovascular:      Rate and Rhythm: Normal rate and regular rhythm.      Pulses: Normal pulses.      Heart sounds: Normal heart sounds.   Pulmonary:      Effort: Pulmonary effort is normal.      Breath sounds: Normal breath sounds.   Abdominal:      General: Bowel sounds are normal. There is no distension.      Palpations: Abdomen is soft.    Musculoskeletal:         General: No swelling. Normal range of motion.      Cervical back: Normal range of motion.   Skin:     General: Skin is warm and dry.      Findings: No rash.   Neurological:      General: No focal deficit present.      Mental Status: She is alert and oriented to person, place, and time.   Psychiatric:         Mood and Affect: Mood normal.         Behavior: Behavior normal.         Thought Content: Thought content normal.         Judgment: Judgment normal.         Health Maintenance         Date Due Completion Date    TETANUS VACCINE Never done ---    Pneumococcal Vaccines (Age 0-49) (1 of 2 - PCV) Never done ---    Pap Smear 09/06/2023 9/6/2022    Influenza Vaccine (1) 09/01/2024 1/21/2013    COVID-19 Vaccine (3 - 2024-25 season) 09/01/2024 10/14/2021    RSV Vaccine (Age 60+ and Pregnant patients) (1 - 1-dose 75+ series) 12/25/2070 ---          ASSESSMENT     29 y.o. female with     1. Major depressive disorder, recurrent episode, mild    2. Generalized anxiety disorder    3. Fibromyalgia    4. Gastroesophageal reflux disease without esophagitis    5. Class 3 severe obesity due to excess calories with serious comorbidity and body mass index (BMI) of 50.0 to 59.9 in adult    6. Encounter for smoking cessation counseling    7. Encounter for annual physical exam    8. Other insomnia    9. Vitamin D deficiency    10. Need for hepatitis C screening test    11. Abnormal blood chemistry    12. Gastroenteritis        PLAN:     1. Major depressive disorder, recurrent episode, mild  Ongoing. Refilled medications for 1 month. Medications to be refilled by .   - ABILIFY 5 mg Tab; Take 1 tablet (5 mg total) by mouth every evening.  Dispense: 30 tablet; Refill: 0  - Ambulatory referral/consult to Psychiatry; Future  - PRISTIQ 100 mg Tb24; Take 1 tablet (100 mg total) by mouth once daily.  Dispense: 30 tablet; Refill: 0  - Ambulatory referral/consult to Primary Care Behavioral Health (Non-Opioids);  Future    2. Generalized anxiety disorder  Ongoing. Refilled medications.   - Ambulatory referral/consult to Psychiatry; Future  - hydrOXYzine pamoate (VISTARIL) 50 MG Cap; Take 1 capsule (50 mg total) by mouth every 8 (eight) hours as needed (Anxiety).  Dispense: 90 capsule; Refill: 1  - Ambulatory referral/consult to Primary Care Behavioral Health (Non-Opioids); Future    3. Fibromyalgia  Stable. Refilled medications.   - amitriptyline (ELAVIL) 50 MG tablet; Take 1 tablet (50 mg total) by mouth every evening.  Dispense: 30 tablet; Refill: 1  - cyclobenzaprine (FLEXERIL) 10 MG tablet; Take 1 tablet (10 mg total) by mouth 3 (three) times daily as needed for Muscle spasms.  Dispense: 90 tablet; Refill: 11    4. Other insomnia  Chronic. Referred to Sleep Medicine for evaluation   - Ambulatory referral/consult to Sleep Disorders; Future    5. Gastroenteritis  Improving.     6. Gastroesophageal reflux disease without esophagitis  Ongoing. Refilled pantoprazole.   Follow an antireflux regimen.  This includes:       - Do not lie down for at least 3 to 4 hours after meals.        - Raise the head of the bed 4 to 6 inches.        - Decrease excess weight.        - Avoid citrus juices and other acidic foods, alcohol, chocolate, mints, coffee and other caffeinated beverages, carbonated beverages, fatty and fried foods.        - Avoid tight-fitting clothing.        - Avoid cigarettes and other tobacco products.    - pantoprazole (PROTONIX) 40 MG tablet; Take 1 tablet (40 mg total) by mouth once daily.  Dispense: 90 tablet; Refill: 2    7. Class 3 severe obesity due to excess calories with serious comorbidity and body mass index (BMI) of 50.0 to 59.9 in adult  Chronic. Referred to Bariatric medicine per patient request.   Ordered labs to check levels. Will treat accordingly once labs are reviewed.    - Discussed importance of eating a prudent diet and exercising?     - Hemoglobin A1C; Future  - LIPID PANEL; Future  - TSH;  Future  - Ambulatory referral/consult to Bariatric/Obesity Medicine; Future    8. Encounter for smoking cessation counseling  Patient in agreement to smoking cessation program. Smoking cessation discussed with about for 5 minutes.   - Ambulatory referral/consult to Smoking Cessation Program; Future    9. Encounter for annual physical exam  - Discussed age and gender appropriate screenings at this visit and encouraged a healthy diet low in simple carbohydrates, and increased physical activity.  Counseled on medically appropriate vaccines based on age and current health condition.  Screening test reviewed and discussed with patient.      10. Vitamin D deficiency  Due. Ordered.   - Vitamin D; Future    11. Need for hepatitis C screening test  Due. Ordered.   - HEPATITIS C ANTIBODY; Future    12. Abnormal blood chemistry  Ordered labs to check levels. Will treat accordingly once labs are reviewed.    - CBC Auto Differential; Future  - Comprehensive Metabolic Panel; Future      Assessment & Plan    IMPRESSION:  - Conducted initial visit to establish care,  - Reviewed medical history, including family history of diabetes, heart problems, and cancer.  - Assessed current medications and refills sent to pharmacy.  - Evaluated weight concerns and discussed potential for bariatric surgery.  - Will order sleep study.  - Addressed GI symptoms and adjusted medication accordingly.  - Evaluated depression and anxiety, opting for referrals to behavioral health services. Patient denies any suicidal thoughts and plans.     GASTROESOPHAGEAL REFLUX DISEASE (GERD):  - Explained importance of consistent medication use, especially for acid reflux management.  - Educated on triggers for acid reflux, including red sauce, fatty foods, spicy foods, coffee, soda, beer, and orange juice.  - Continued Pantoprazole (Protonix) 40 mg orally daily.    MAJOR DEPRESSIVE DISORDER:  - Refill Pristiq for major depressive disorder, daily for 1 month.  -  Continued Abilify 5 mg in the evening.  - Continued Amitriptyline.    ANXIETY DISORDER:  - Continued Hydroxyzine up to 3 times daily as needed for anxiety.    MUSCULOSKELETAL PAIN:  - Continued Flexeril.    LABS:  - Ordered CMP to check kidney and liver function.  - Ordered CBC.  - Ordered Vitamin D level.  - Ordered hemoglobin A1c to check for diabetes.  - Ordered lipid panel to check cholesterol levels.  - Ordered hepatitis C antibody test.  - Ordered TSH test.    MENTAL HEALTH MANAGEMENT:  - Referred to behavioral health therapy and psychiatry for medication management.    SLEEP DISORDERS:  - Referred to sleep medicine.    TOBACCO USE:  - Referred to smoking cessation program.    OBESITY:  - Discussed concept of transfer addiction post-bariatric surgery and benefits of cognitive behavioral therapy.  - Referred to bariatric clinic.    FOLLOW-UP:  - Follow up in 3 weeks to review lab results and referral outcomes.        Esme García, MSN, APRN, FNP-C  [facility]  03/27/2025 9:03 AM    I spent a total of 40 minutes on the day of the visit was used to address patient's multiple concerns.This includes face to face time and non-face to face time preparing to see the patient (eg, review of tests), obtaining and/or reviewing separately obtained history, documenting clinical information in the electronic or other health record, independently interpreting results and communicating results to the patient/family/caregiver, or care coordinator.     This note was generated with the assistance of ambient listening technology. Verbal consent was obtained by the patient and accompanying visitor(s) for the recording of patient appointment to facilitate this note. I attest to having reviewed and edited the generated note for accuracy, though some syntax or spelling errors may persist. Please contact the author of this note for any clarification.    Follow up in about 3 weeks (around 4/17/2025) for Dr. Aldana  Froylan.         [1]   Social History  Socioeconomic History    Marital status: Single   Tobacco Use    Smoking status: Every Day     Current packs/day: 0.50     Types: Cigarettes    Smokeless tobacco: Never   Substance and Sexual Activity    Alcohol use: No    Drug use: Yes     Types: Marijuana    Sexual activity: Not Currently     Social Drivers of Health     Financial Resource Strain: High Risk (4/22/2024)    Received from [facility]    Overall Financial Resource Strain (CARDIA)     Difficulty of Paying Living Expenses: Very hard   Food Insecurity: Food Insecurity Present (4/22/2024)    Received from [facility]    Hunger Vital Sign     Worried About Running Out of Food in the Last Year: Sometimes true     Ran Out of Food in the Last Year: Never true   Transportation Needs: No Transportation Needs (4/22/2024)    Received from [facility]    PRAPARE - Transportation     Lack of Transportation (Medical): No     Lack of Transportation (Non-Medical): No   Physical Activity: Inactive (4/22/2024)    Received from [facility]    Exercise Vital Sign     Days of Exercise per Week: 0 days     Minutes of Exercise per Session: 0 min   Stress: Stress Concern Present (4/22/2024)    Received from [facility]    [employer] - Occupational Stress Questionnaire     Feeling of Stress : To some extent   Housing Stability: High Risk (6/10/2022)    Received from [facility]    Housing Stability Vital Sign     Unable to Pay for Housing in the Last Year: Yes     Unstable Housing in the Last Year: No   [2]   Current Outpatient Medications   Medication Sig Dispense Refill    ALAHIST D 10-17.5 mg Tab Take 1 tablet by mouth every 4 (four) hours as needed.      azelastine (ASTELIN) 137 mcg (0.1 %) nasal spray 1 spray 2 (two) times daily.      butalbital-acetaminophen-caff -40 mg Cap Take by mouth.      cholecalciferol, vitamin D3, 1,250 mcg (50,000 unit) capsule Take 50,000 Units by mouth every 7 days.       dextroamphetamine-amphetamine (ADDERALL XR) 10 MG 24 hr capsule 1 capsule in the morning Orally Once a day for 30 days      dicyclomine (BENTYL) 10 MG capsule 1 capsule.      doxycycline (VIBRA-TABS) 100 MG tablet Take 100 mg by mouth 2 (two) times daily.      fluticasone propionate (FLONASE) 50 mcg/actuation nasal spray 2 sprays (100 mcg total) by Each Nostril route daily as needed for Allergies or Rhinitis. 15.8 mL 0    phenylephrine-DM-guaiFENesin 10-17.5-400 mg Tab Take 1 tablet by mouth every 4 (four) hours as needed.      urea 20 % Crea APPLY TOPICALLY ONCE DAILY TO DRY SKIN ON FEET 85 g 6    ABILIFY 5 mg Tab Take 1 tablet (5 mg total) by mouth every evening. 30 tablet 0    amitriptyline (ELAVIL) 50 MG tablet Take 1 tablet (50 mg total) by mouth every evening. 30 tablet 1    cyclobenzaprine (FLEXERIL) 10 MG tablet Take 1 tablet (10 mg total) by mouth 3 (three) times daily as needed for Muscle spasms. 90 tablet 11    KORY FE 1.5/30, 28, 1.5 mg-30 mcg (21)/75 mg (7) tablet Take 1 tablet by mouth once daily. 90 tablet 3    hydrOXYzine pamoate (VISTARIL) 50 MG Cap Take 1 capsule (50 mg total) by mouth every 8 (eight) hours as needed (Anxiety). 90 capsule 1    pantoprazole (PROTONIX) 40 MG tablet Take 1 tablet (40 mg total) by mouth once daily. 90 tablet 2    PRISTIQ 100 mg Tb24 Take 1 tablet (100 mg total) by mouth once daily. 30 tablet 0     No current facility-administered medications for this visit.

## 2025-03-29 ENCOUNTER — RESULTS FOLLOW-UP (OUTPATIENT)
Dept: PRIMARY CARE CLINIC | Facility: CLINIC | Age: 30
End: 2025-03-29

## 2025-03-31 ENCOUNTER — TELEPHONE (OUTPATIENT)
Dept: PRIMARY CARE CLINIC | Facility: CLINIC | Age: 30
End: 2025-03-31
Payer: MEDICAID

## 2025-04-01 ENCOUNTER — TELEPHONE (OUTPATIENT)
Dept: PRIMARY CARE CLINIC | Facility: CLINIC | Age: 30
End: 2025-04-01
Payer: MEDICAID

## 2025-04-03 ENCOUNTER — CLINICAL SUPPORT (OUTPATIENT)
Dept: SMOKING CESSATION | Facility: CLINIC | Age: 30
End: 2025-04-03
Payer: MEDICAID

## 2025-04-03 DIAGNOSIS — F17.200 NICOTINE DEPENDENCE: Primary | ICD-10-CM

## 2025-04-03 PROCEDURE — 99999 PR PBB SHADOW E&M-EST. PATIENT-LVL II: CPT | Mod: PBBFAC,,,

## 2025-04-03 PROCEDURE — 99212 OFFICE O/P EST SF 10 MIN: CPT | Mod: PBBFAC

## 2025-04-03 RX ORDER — DM/P-EPHED/ACETAMINOPH/DOXYLAM 30-7.5/3
2 LIQUID (ML) ORAL
Qty: 72 LOZENGE | Refills: 0 | Status: SHIPPED | OUTPATIENT
Start: 2025-04-03

## 2025-04-03 RX ORDER — IBUPROFEN 200 MG
1 TABLET ORAL DAILY
Qty: 14 PATCH | Refills: 0 | Status: SHIPPED | OUTPATIENT
Start: 2025-04-03

## 2025-04-03 NOTE — PROGRESS NOTES
Patient seen in clinic for Quit 1 intake. Patient reports smoking 1 pack of cigarettes a day. FTND score of 7 indicates a high level of nicotine dependence. THAO-D score of 48 perceived as significant degree of mental distress / probable depression. Patient will begin the prescribed tobacco cessation medication regimen of 21 mg Nicotine patch and 2 mg Nicotine lozenges. Discussed and reviewed with the patient regarding NRT's and medication along with behavioral therapy & counseling to assist patient with meeting her goal of being smoke free. Patient is agreeable to participate in bi-weekly sessions as well as group therapy when it is available. Patient educated on role & usage possible side effects. Patient provided with smoking diary to log her daily cigarette usage. Reviewed behavioral modification strategy of rate reduction and wait time of 15 min prior to smoking. Patient verbalized understanding & willingness to apply. Patient instructed to call TTS with any questions or concerns. Current CO measurement = 9  ppm (0-6 ppm is a non-smoker). Patients everywhere in house. Patient boyfriend smokes but does not live with her.

## 2025-04-10 ENCOUNTER — TELEPHONE (OUTPATIENT)
Dept: PRIMARY CARE CLINIC | Facility: CLINIC | Age: 30
End: 2025-04-10
Payer: MEDICAID

## 2025-04-10 NOTE — TELEPHONE ENCOUNTER
----- Message from Ly sent at 4/10/2025 11:17 AM CDT -----  Contact: [phone]  Patient called, would like to get a call back from nurse in regards the hold up on Rx dextroamphetamine-amphetamine (ADDERALL XR) 10 MG 24 hr capsule, pharmacy stating that pcp has not sent it out. That need to be verified. [facility] [address] [place] [address] [place] [facility] Phone: [phone]  Fax: [phone]  Thank you

## 2025-04-10 NOTE — TELEPHONE ENCOUNTER
Pt states made error contacting the wrong provider   States adderall has been filled by physiatrist

## 2025-04-16 ENCOUNTER — PATIENT OUTREACH (OUTPATIENT)
Dept: ADMINISTRATIVE | Facility: HOSPITAL | Age: 30
End: 2025-04-16
Payer: MEDICAID

## 2025-04-16 ENCOUNTER — OFFICE VISIT (OUTPATIENT)
Dept: PRIMARY CARE CLINIC | Facility: CLINIC | Age: 30
End: 2025-04-16
Payer: MEDICAID

## 2025-04-16 VITALS
SYSTOLIC BLOOD PRESSURE: 122 MMHG | DIASTOLIC BLOOD PRESSURE: 70 MMHG | OXYGEN SATURATION: 99 % | TEMPERATURE: 98 F | BODY MASS INDEX: 48.82 KG/M2 | HEART RATE: 85 BPM | HEIGHT: 65 IN | WEIGHT: 293 LBS

## 2025-04-16 DIAGNOSIS — L84 CALLUS OF FOOT: Primary | ICD-10-CM

## 2025-04-16 DIAGNOSIS — E66.813 CLASS 3 SEVERE OBESITY DUE TO EXCESS CALORIES WITH SERIOUS COMORBIDITY AND BODY MASS INDEX (BMI) OF 50.0 TO 59.9 IN ADULT: ICD-10-CM

## 2025-04-16 DIAGNOSIS — G47.30 SLEEP APNEA, UNSPECIFIED TYPE: ICD-10-CM

## 2025-04-16 DIAGNOSIS — E66.01 CLASS 3 SEVERE OBESITY DUE TO EXCESS CALORIES WITH SERIOUS COMORBIDITY AND BODY MASS INDEX (BMI) OF 50.0 TO 59.9 IN ADULT: ICD-10-CM

## 2025-04-16 PROBLEM — K59.09 OTHER CONSTIPATION: Status: RESOLVED | Noted: 2022-06-10 | Resolved: 2025-04-16

## 2025-04-16 PROBLEM — R29.898 WEAKNESS OF RIGHT LOWER EXTREMITY: Status: RESOLVED | Noted: 2022-09-21 | Resolved: 2025-04-16

## 2025-04-16 PROBLEM — F41.9 ANXIETY: Status: RESOLVED | Noted: 2019-01-06 | Resolved: 2025-04-16

## 2025-04-16 PROBLEM — I10 ELEVATED BLOOD PRESSURE READING IN OFFICE WITH DIAGNOSIS OF HYPERTENSION: Status: RESOLVED | Noted: 2022-02-01 | Resolved: 2025-04-16

## 2025-04-16 PROBLEM — F32.A DEPRESSIVE DISORDER: Status: RESOLVED | Noted: 2019-01-06 | Resolved: 2025-04-16

## 2025-04-16 PROBLEM — R51.9 PERSISTENT HEADACHES: Status: RESOLVED | Noted: 2019-06-05 | Resolved: 2025-04-16

## 2025-04-16 PROCEDURE — 99215 OFFICE O/P EST HI 40 MIN: CPT | Mod: PBBFAC,PN

## 2025-04-16 PROCEDURE — 99999 PR PBB SHADOW E&M-EST. PATIENT-LVL V: CPT | Mod: PBBFAC,,,

## 2025-04-16 NOTE — PROGRESS NOTES
"Subjective:       Patient ID: Saul Huff is a 29 y.o. female.    Chief Complaint: Obesity, fatigue  HPI    Saul Huff is a 29 y.o. year old female  who comes to clinic for obesity and fatigue    SLEEP CONCERNS:  She reports sleep disturbances including middle-of-night awakening and inability to maintain continuous sleep. Others have noted she moans in her sleep. She experiences excessive daytime sleepiness, nearly falling asleep at work multiple times.    She reports migraines beginning approximately one month ago. She has painful calluses on both feet and ankle pain during morning walks. Her sinus symptoms have improved since restarting Flonase.      STOP-Bang questionnaire   Yes  Snoring?  Do you snore loudly (loud enough to be heard through closed doors, or your bed partner elbows you for snoring at night)?   Yes  Tired?  Do you often feel tired, fatigued, or sleepy during the daytime (such as falling asleep during driving)?    No Observed?  Has anyone observed you stop breathing or choking/gasping during your sleep?    No Pressure?  Do you have or are being treated for high blood pressure?   Yes  Body mass index more than 35 kg/m2?    No Age older than 50 years old?   Yes  Neck size large? (measured around Ricardo's apple)  For male, is your shirt collar 17 inches or larger?  For female, is your shirt collar 16 inches or larger?    No Gender = Male?   Scoring criteria: 4      Intermediate risk of MEY: Yes to 3 to 4 questions          PMH: fibromyalgia, adhd, migraines, IBS, mdd/anxiety  PSH: denies  Meds: Amitriptyline, hydroxyzine, Protonix, Adderall, Flonase  NKDA  Social hx: Smokes 0.5ppd x1 yr      ROS negative except as above  Objective:      /70 (BP Location: Left arm, Patient Position: Sitting)   Pulse 85   Temp 97.8 °F (36.6 °C) (Oral)   Ht 5' 5" (1.651 m)   Wt (!) 162.3 kg (357 lb 12.9 oz)   LMP 03/23/2025   SpO2 99%   BMI 59.54 kg/m²    Physical Exam  Vitals reviewed. "   Constitutional:       Appearance: Normal appearance.   HENT:      Head: Normocephalic.      Mouth/Throat:      Mouth: Mucous membranes are moist.   Cardiovascular:      Rate and Rhythm: Normal rate and regular rhythm.      Pulses: Normal pulses.      Heart sounds: Normal heart sounds.   Pulmonary:      Effort: Pulmonary effort is normal.      Breath sounds: Normal breath sounds. No wheezing or rhonchi.   Abdominal:      General: Abdomen is flat. There is no distension.   Musculoskeletal:         General: No swelling. Normal range of motion.      Cervical back: Normal range of motion.      Comments: Foot callus on bilateral feet   Skin:     General: Skin is warm.      Coloration: Skin is not jaundiced.   Neurological:      Mental Status: She is alert.         Assessment:       1. Callus of foot    2. Class 3 severe obesity due to excess calories with serious comorbidity and body mass index (BMI) of 50.0 to 59.9 in adult    3. Sleep apnea, unspecified type          Plan:       1. Callus of foot (Primary)  Foot callus on bilateral feet, chronic condition. Mildly tender  Wants referral for Podiatry  - Ambulatory referral/consult to Podiatry; Future    2. Class 3 severe obesity due to excess calories with serious comorbidity and body mass index (BMI) of 50.0 to 59.9 in adult  BMI today is 59.5, comorbidity includes fatigue  Discussed the following:  Goal of 150 minutes aerobic exercise weekly. Recommend at least 30 mins, 3 days weekly and work up to 5 days.  Target heart rate while performing aerobic activity.   Monitoring caloric intake  Organizing plate with half vegetables, quarter whole grain starches or starchy vegetables, and quarter lean protein.   Goal of at least 1 vegetarian meal weekly and emphasizing fresh vegetables and fruits in diet.   Emphasize decreasing processed foods, soda, and eating out as these frequently have added unnecessary calories.  Patient has appointment with bariatric surgery coming  up      3. Sleep apnea, unspecified type  Stop Bang questionnaire score:4  She endorses fatigue throughout the day and multiple night time awakenings  Declines referral for sleep medicine at this time until after she sees bariatric surgery.          Follow up in about 5 months (around 9/16/2025) for bariatrics referral follow up .      Jovan Galeano M.D.    This note was generated with the assistance of ambient listening technology. Verbal consent was obtained by the patient and accompanying visitor(s) for the recording of patient appointment to facilitate this note. I attest to having reviewed and edited the generated note for accuracy, though some syntax or spelling errors may persist. Please contact the author of this note for any clarification.

## 2025-04-21 ENCOUNTER — TELEPHONE (OUTPATIENT)
Dept: SMOKING CESSATION | Facility: CLINIC | Age: 30
End: 2025-04-21
Payer: MEDICAID

## 2025-04-21 NOTE — TELEPHONE ENCOUNTER
Smoking Cessation counselor contacted patient regarding no show appointment for follow-up visit this afternoon.Spoke to patient and she stated that it was pouring raining and wanted to get home she was also having tire issues. Patient rescheduled follow up appointment for 5/1/25 4 pm.   Teresa Mitchell RRT,CTTS,[facility]  [phone]

## 2025-04-22 ENCOUNTER — TELEPHONE (OUTPATIENT)
Dept: PODIATRY | Facility: CLINIC | Age: 30
End: 2025-04-22
Payer: MEDICAID

## 2025-04-28 ENCOUNTER — TELEPHONE (OUTPATIENT)
Dept: PODIATRY | Facility: CLINIC | Age: 30
End: 2025-04-28
Payer: MEDICAID

## 2025-04-28 NOTE — TELEPHONE ENCOUNTER
Staff tried to contact patient, no answer, left a message on voice mail informing patient that we are not accepting new patient at this time and if she have any questions to call [phone].

## 2025-04-28 NOTE — TELEPHONE ENCOUNTER
----- Message from Michelle sent at 4/28/2025 11:45 AM CDT -----  Type:  Patient Returning CallWho Called PtWho Left Message for Patient: Does the patient know what this is regarding?: an apptWould the patient rather a call back or a response via [facility]?  Call [facility] Call Back Number: [phone] Information:

## 2025-05-19 ENCOUNTER — TELEPHONE (OUTPATIENT)
Dept: PRIMARY CARE CLINIC | Facility: CLINIC | Age: 30
End: 2025-05-19
Payer: MEDICAID

## 2025-05-19 ENCOUNTER — PATIENT MESSAGE (OUTPATIENT)
Dept: PRIMARY CARE CLINIC | Facility: CLINIC | Age: 30
End: 2025-05-19
Payer: MEDICAID

## 2025-05-19 NOTE — TELEPHONE ENCOUNTER
----- Message from Yvette sent at 5/19/2025  8:06 AM CDT -----  Contact: Patient, [phone]  .[email or web address] Patient is calling for Medical Advice regarding: Vomiting and diarrheaHow long has patient had these symptoms: 3 weeksPharmacy name and phone#: [id number] - [place], [place] - [address] [facility] [id number] [facility] [address]: [phone] Fax: [phone] Patient wants a call back or thru [facility]: Call backComments: Calling for advise. Please call her. Thanks.Please advise patient replies from provider may take up to 48 hours.

## 2025-05-21 ENCOUNTER — CLINICAL SUPPORT (OUTPATIENT)
Dept: SMOKING CESSATION | Facility: CLINIC | Age: 30
End: 2025-05-21
Payer: MEDICAID

## 2025-05-21 DIAGNOSIS — F17.200 NICOTINE DEPENDENCE: Primary | ICD-10-CM

## 2025-05-21 PROCEDURE — 99212 OFFICE O/P EST SF 10 MIN: CPT | Mod: PBBFAC

## 2025-05-21 PROCEDURE — 99999 PR PBB SHADOW E&M-EST. PATIENT-LVL II: CPT | Mod: PBBFAC,,,

## 2025-05-21 RX ORDER — VARENICLINE TARTRATE 0.5 (11)-1
KIT ORAL
Qty: 53 EACH | Refills: 0 | Status: SHIPPED | OUTPATIENT
Start: 2025-05-21

## 2025-05-21 RX ORDER — IBUPROFEN 200 MG
1 TABLET ORAL DAILY
Qty: 14 PATCH | Refills: 0 | Status: SHIPPED | OUTPATIENT
Start: 2025-05-21

## 2025-05-21 NOTE — PROGRESS NOTES
Individual Follow-Up Form    5/21/2025    Quit Date:     Clinical Status of Patient: Outpatient    Length of Service: 60 minutes    Continuing Medication: yes  Patches or Nicotine Lozenges    Other Medications:      Target Symptoms: Withdrawal and medication side effects. The following were  rated moderate (3) to severe (4) on TCRS:  Moderate (3): urge, crave, desire  Severe (4): none    Comments: Patient was seen in clinic this afternoon. Patient reported that she is still smoking 1 ppd. She has not made any behavior changes since last visit. Patient stated that she is possibly have bariatric surgery sometime before the end of the year. Discussed strategies increase wait time in car and at home. Take an empty pack and only allow her self 15 per day. She continues to buy 2 packs at a time. Completion of TCRS (Tobacco Cessation Rating Scale) reviewed strategies, cues, and triggers. Introduced the negative impact of tobacco on health, the health advantages of discontinuing the use of tobacco, time line improved health changes after a quit, withdrawal issues to expect from nicotine and habit, and ways to achieve the goal of a quit. Discussed and reviewed adding Chantix 0.5 - 1 mg to patient smoking cessation medication regimen. Reviewed possible side effects and encouraged patient to call CTTS if any question or concerns after starting Chantix.  Exhaled carbon monoxide level of 15 ppm per Smokerlyzer (0-6 non-smoker). The patient will continue with  therapy sessions and medication monitoring by CTTS. Prescribed medication management will be by physician. The patient remains on the prescribed tobacco cessation medication regimen of 21 mg Nicotine patch without any negative side effects at this time. The patient denies any abnormal behavioral or mental changes at this time.        Diagnosis: F17.200    Next Visit: 2 weeks

## 2025-05-30 ENCOUNTER — ON-DEMAND VIRTUAL (OUTPATIENT)
Dept: URGENT CARE | Facility: CLINIC | Age: 30
End: 2025-05-30
Payer: MEDICAID

## 2025-05-30 DIAGNOSIS — J32.9 SINUSITIS, UNSPECIFIED CHRONICITY, UNSPECIFIED LOCATION: Primary | ICD-10-CM

## 2025-05-30 RX ORDER — PROMETHAZINE HYDROCHLORIDE AND DEXTROMETHORPHAN HYDROBROMIDE 6.25; 15 MG/5ML; MG/5ML
5 SYRUP ORAL EVERY 6 HOURS PRN
Qty: 118 ML | Refills: 0 | Status: SHIPPED | OUTPATIENT
Start: 2025-05-30 | End: 2025-06-09

## 2025-05-30 RX ORDER — AZITHROMYCIN 250 MG/1
TABLET, FILM COATED ORAL
Qty: 6 TABLET | Refills: 0 | Status: SHIPPED | OUTPATIENT
Start: 2025-05-30 | End: 2025-06-04

## 2025-05-30 NOTE — PROGRESS NOTES
Subjective:      Patient ID: Saul Huff is a 29 y.o. female.    Vitals:  vitals were not taken for this visit.     Chief Complaint: URI      Visit Type: TELE AUDIOVISUAL - This visit was conducted virtually based on  subjective information and limited objective exam    Present with the patient at the time of consultation: TELEMED PRESENT WITH PATIENT: None  LOCATION OF PATIENT [place], [place]  Two patient identifiers used to verify patient- saying out date of birth and full name.       Past Medical History:   Diagnosis Date    Allergy     Anxiety     Fibromyalgia     Recurrent upper respiratory infection (URI)      Past Surgical History:   Procedure Laterality Date    SINUS SURGERY       Review of patient's allergies indicates:   Allergen Reactions    Iodinated contrast media Nausea And Vomiting     Medications Ordered Prior to Encounter[1]  Family History   Problem Relation Name Age of Onset    Hypertension Mother          sleep apnea    Diabetes Father          schizophrenia, bipolar, hypertension, chf    Lupus Sister          MS    Uterine cancer Maternal Grandmother      Alcohol abuse Maternal Grandfather      Ovarian cancer Paternal Grandmother      Schizophrenia Paternal Grandfather      Breast cancer Neg Hx      Colon cancer Neg Hx      Allergic rhinitis Neg Hx      Allergies Neg Hx      Angioedema Neg Hx      Asthma Neg Hx      Atopy Neg Hx      Eczema Neg Hx      Immunodeficiency Neg Hx      Rhinitis Neg Hx      Urticaria Neg Hx         Medications Ordered                [id number] [place], [place] - [address] [address]    Telephone: [phone]   Fax: [phone]   Hours: Not open 24 hours                         E-Prescribed (2 of 2)              azithromycin (Z-BENJA) 250 MG tablet    Sig: Take 2 tablets by mouth on day 1; Take 1 tablet by mouth on days 2-5       Start: 5/30/25     Quantity: 6 tablet Refills: 0                          promethazine-dextromethorphan (PROMETHAZINE-DM) 6.25-15 mg/5 mL Syrp    Sig: Take 5 mLs by mouth every 6 (six) hours as needed (cough).       Start: 5/30/25     Quantity: 118 mL Refills: 0                           Ohs Peq Odvv Intake    5/30/2025  9:46 AM CDT - Filed by Patient   What is your current physical address in the event of a medical emergency? [address]   Are you able to take your vital signs? No   Please attach any relevant images or files    Is your employer contracted with [facility]? No         28 yo female c/o sore throat, cough, nasal congestion for the past week.   Tried flonase, robitussin, zyrtec, benadryl with minimal relief of sx.   Reports sinus pressure improved, but cough worsened.   Denies fever, chills, SOB, wheezing, ear pain.           Constitution: Negative for activity change, appetite change, chills and fever.   HENT:  Positive for congestion, postnasal drip and sore throat. Negative for ear pain, sinus pain, sinus pressure, trouble swallowing and voice change.    Respiratory:  Positive for cough and sputum production. Negative for shortness of breath and wheezing.    Gastrointestinal:  Negative for nausea, vomiting, constipation and diarrhea.        Objective:   The physical exam was conducted virtually.    AAO x 3 ; no acute distress noted; appearance normal; mood and behavior normal; thought process normal  Head- normocephalic  Nose-nasal congestion, sinus pressure  Eyes- pupils appear normal in size, no drainage, no erythema  Ears- normal appearing; no discharge, no erythema  Mouth- appears normal  Oropharynx- no erythema, lesions  Lungs- breathing at a normal rate, no acute distress noted  Heart- no reports of tachycardia, palpitations, chest pain  Abdomen- non distended, non tender reported by patient  Skin- warm and dry, no erythema or edema noted by patient or visualized  Psych- as above; no si/hi      Assessment:     1. Sinusitis, unspecified chronicity,  unspecified location        Plan:     -Below are suggestions for symptomatic relief:              -Tylenol every 4 hours OR ibuprofen every 6 hours as needed for pain/fever.              -Salt water gargles to soothe throat pain.              -Chloroseptic spray also helps to numb throat pain.              -Nasal saline spray reduces inflammation and dryness.              -Warm face compresses to help with facial sinus pain/pressure.              -Vicks vapor rub at night.              -Flonase OTC or Nasacort OTC for nasal congestion.              -Simple foods like chicken noodle soup.              -Delsym helps with coughing at night              -Zyrtec/Claritin during the day & Benadryl at night may help with allergies.     If you DO NOT have Hypertension or any history of palpitations, it is ok to take over the counter Sudafed or Mucinex D or Allegra-D or Claritin-D or Zyrtec-D.  If you do take one of the above, it is ok to combine that with plain over the counter Mucinex or Allegra or Claritin or Zyrtec. If, for example, you are taking Zyrtec -D, you can combine that with Mucinex, but not Mucinex-D.  If you are taking Mucinex-D, you can combine that with plain Allegra or Claritin or Zyrtec.   If you DO have Hypertension or palpitations, it is safe to take Coricidin HBP for relief of sinus symptoms.     Please follow up with your Primary care provider within 2-5 days if your signs and symptoms have not resolved or worsen.      If your condition worsens or fails to improve we recommend that you receive another evaluation at the emergency room immediately or contact your primary medical clinic to discuss your concerns.   You must understand that you have received an Urgent Care treatment only and that you may be released before all of your medical problems are known or treated. You, the patient, will arrange for follow up care as instructed.      RED FLAGS/WARNING SYMPTOMS DISCUSSED WITH PATIENT THAT WOULD  WARRANT EMERGENT MEDICAL ATTENTION. PATIENT VERBALIZED UNDERSTANDING.       Thank you for choosing [facility]!    Our goal in the [facility] is to always provide outstanding medical care. You may receive a survey by mail or e-mail in the next week regarding your experience today. We would greatly appreciate you completing and returning the survey. Your feedback provides us with a way to recognize our staff who provide very good care, and it helps us learn how to improve when your experience was below our aspiration of excellence.         We appreciate you trusting us with your medical care. We hope you feel better soon. We will be happy to take care of you for all of your future medical needs.    You must understand that you've received an Urgent Care treatment only and that you may be released before all your medical problems are known or treated. You, the patient, will arrange for follow up care as instructed.    Follow up with your PCP or specialty clinic as directed in the next 1-2 weeks if not improved or as needed.  You can call [phone] to schedule an appointment with the appropriate provider.    If your condition worsens we recommend that you receive another evaluation in person, with your primary care provider, urgent care or at the emergency room immediately or contact your primary medical clinics after hours call service to discuss your concerns.         Sinusitis, unspecified chronicity, unspecified location  -     azithromycin (Z-BENJA) 250 MG tablet; Take 2 tablets by mouth on day 1; Take 1 tablet by mouth on days 2-5  Dispense: 6 tablet; Refill: 0  -     promethazine-dextromethorphan (PROMETHAZINE-DM) 6.25-15 mg/5 mL Syrp; Take 5 mLs by mouth every 6 (six) hours as needed (cough).  Dispense: 118 mL; Refill: 0                         [1]   Current Outpatient Medications on File Prior to Visit   Medication Sig Dispense Refill    ABILIFY 5 mg Tab Take 1 tablet (5  mg total) by mouth every evening. 30 tablet 0    ALAHIST D 10-17.5 mg Tab Take 1 tablet by mouth every 4 (four) hours as needed.      amitriptyline (ELAVIL) 50 MG tablet Take 1 tablet (50 mg total) by mouth every evening. 30 tablet 1    butalbital-acetaminophen-caff -40 mg Cap Take by mouth.      cyclobenzaprine (FLEXERIL) 10 MG tablet Take 1 tablet (10 mg total) by mouth 3 (three) times daily as needed for Muscle spasms. 90 tablet 11    dextroamphetamine-amphetamine (ADDERALL XR) 10 MG 24 hr capsule 1 capsule in the morning Orally Once a day for 30 days      KORY FE 1.5/30, 28, 1.5 mg-30 mcg (21)/75 mg (7) tablet Take 1 tablet by mouth once daily. 90 tablet 3    nicotine (NICODERM CQ) 21 mg/24 hr Place 1 patch onto the skin once daily. 14 patch 0    nicotine polacrilex 2 MG Lozg Take 1 lozenge (2 mg total) by mouth as needed (in place of the cigarettes 6-8 times a day). No Cinnamon flavor 72 lozenge 0    pantoprazole (PROTONIX) 40 MG tablet Take 1 tablet (40 mg total) by mouth once daily. 90 tablet 2    PRISTIQ 100 mg Tb24 Take 1 tablet (100 mg total) by mouth once daily. 30 tablet 0    urea 20 % Crea APPLY TOPICALLY ONCE DAILY TO DRY SKIN ON FEET 85 g 6    varenicline tartrate (CHANTIX STARTING MONTH BOX) 0.5 mg (11)- 1 mg (42) tablet Take one 0.5mg tab by mouth once daily X3 days,then increase to one 0.5mg tab twice daily X4 days,then increase to one 1mg tab twice daily 53 each 0     No current facility-administered medications on file prior to visit.

## 2025-06-18 ENCOUNTER — PATIENT MESSAGE (OUTPATIENT)
Dept: PRIMARY CARE CLINIC | Facility: CLINIC | Age: 30
End: 2025-06-18
Payer: COMMERCIAL

## 2025-06-20 ENCOUNTER — OFFICE VISIT (OUTPATIENT)
Dept: PRIMARY CARE CLINIC | Facility: CLINIC | Age: 30
End: 2025-06-20
Payer: COMMERCIAL

## 2025-06-20 VITALS
HEART RATE: 85 BPM | TEMPERATURE: 99 F | WEIGHT: 293 LBS | OXYGEN SATURATION: 100 % | DIASTOLIC BLOOD PRESSURE: 68 MMHG | SYSTOLIC BLOOD PRESSURE: 105 MMHG | BODY MASS INDEX: 48.82 KG/M2 | HEIGHT: 65 IN
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
## 2025-06-20 DIAGNOSIS — E66.01 MORBID OBESITY WITH BMI OF 60.0-69.9, ADULT: ICD-10-CM

## 2025-06-20 DIAGNOSIS — F33.0 MAJOR DEPRESSIVE DISORDER, RECURRENT EPISODE, MILD: ICD-10-CM

## 2025-06-20 DIAGNOSIS — T14.8XXA ABRASION OF SKIN: ICD-10-CM

## 2025-06-20 PROCEDURE — 99999 PR PBB SHADOW E&M-EST. PATIENT-LVL IV: CPT | Mod: PBBFAC,,, | Performed by: NURSE PRACTITIONER

## 2025-06-20 NOTE — LETTER
June 20, 2025      [facility]  [address] [place] [address]       Patient: Saul Huff   YOB: 1995  Date of Visit: 06/20/2025    To Whom It May Concern:    Shin Huff  was at [facility] on 06/20/2025. The patient may return to work 06/23/2025  with no restrictions. If you have any questions or concerns, or if I can be of further assistance, please do not hesitate to contact me.    Sincerely,         Elizabeth Colbert NP

## 2025-06-20 NOTE — PROGRESS NOTES
Subjective:       Patient ID: Saul Huff is a 29 y.o. female.    Chief Complaint: slip and fall at school, letter to return to work    History of Present Illness    CHIEF COMPLAINT:  Patient presents today following a slip and fall at [employer] [facility] on Tuesday.    INJURY:  She fell at [facility] on Tuesday during rainy conditions. She slipped on muddy concrete while wearing a backpack and experienced arm pain due to awkward positioning while attempting to catch herself. She sustained scrapes to her knee during the fall. She denies significant injury beyond localized arm discomfort and minor knee abrasions. She notes that if she had moved more carefully, she likely would have avoided the incident.      Past Medical History:   Diagnosis Date    Allergy     Anxiety     Fibromyalgia     Recurrent upper respiratory infection (URI)         Past Surgical History:   Procedure Laterality Date    SINUS SURGERY          Family History   Problem Relation Name Age of Onset    Hypertension Mother          sleep apnea    Diabetes Father          schizophrenia, bipolar, hypertension, chf    Lupus Sister          MS    Uterine cancer Maternal Grandmother      Alcohol abuse Maternal Grandfather      Ovarian cancer Paternal Grandmother      Schizophrenia Paternal Grandfather      Breast cancer Neg Hx      Colon cancer Neg Hx      Allergic rhinitis Neg Hx      Allergies Neg Hx      Angioedema Neg Hx      Asthma Neg Hx      Atopy Neg Hx      Eczema Neg Hx      Immunodeficiency Neg Hx      Rhinitis Neg Hx      Urticaria Neg Hx         Tobacco Use History[1]    Social History     Social History Narrative    Not on file       Review of patient's allergies indicates:   Allergen Reactions    Iodinated contrast media Nausea And Vomiting        Review of Systems   Respiratory:  Negative for chest tightness and shortness of breath.    Cardiovascular:  Negative for chest pain and palpitations.   Gastrointestinal:  Negative for  nausea and vomiting.   Neurological:  Negative for dizziness, light-headedness and headaches.         Objective:        Vitals:    06/20/25 0824   BP: 105/68   Pulse: 85   Temp: 98.5 °F (36.9 °C)        Physical Exam  Pulmonary:      Effort: Pulmonary effort is normal. No respiratory distress.   Musculoskeletal:      Right shoulder: No swelling, laceration, tenderness or crepitus. Normal range of motion. Normal strength. Normal pulse.      Right upper arm: No swelling, edema, deformity, lacerations, tenderness or bony tenderness.      Right elbow: No swelling, deformity or lacerations. Normal range of motion. No tenderness.      Right forearm: No swelling, edema, lacerations or tenderness.      Right wrist: No swelling, deformity, lacerations, tenderness, bony tenderness or crepitus. Normal range of motion. Normal pulse.      Right hand: No swelling, deformity, lacerations or tenderness. Normal range of motion. Normal sensation. Normal capillary refill. Normal pulse.      Left knee: No swelling, effusion, erythema or ecchymosis. Normal range of motion. No tenderness.   Skin:     Findings: Abrasion present.          Neurological:      Mental Status: She is alert and oriented to person, place, and time.         Assessment:       1. Fall due to wet surface, initial encounter    2. Abrasion of skin    3. Major depressive disorder, recurrent episode, mild    4. Morbid obesity with BMI of 60.0-69.9, adult        Plan:       Fall due to wet surface, initial encounter  Comments:  Slipped and fell on the way to class at [employer] to take mid term, braced fall with right extremity, wearing [employer] tennis at time of incidence    Abrasion of skin  Comments:  left knee    Major depressive disorder, recurrent episode, mild  Stable.    Morbid obesity with BMI of 60.0-69.9, adult  Extensive teaching/discussion on Morbid obesity, disease management, interventions/treatment.  Recommend and encouraged weight loss, emphasize  health benefits.      Assessment & Plan    Assessed patient after slip and fall incident on campus, resulting in minor abrasions to the knee.  Evaluated right arm for potential injury due to fall impact while wearing backpack.  Reviewed recent lab results, noting good cholesterol and A1c levels.  Acknowledged patient's plans for bariatric surgery (sleeve) in August.    Fall due to wet surface, initial encounter:  - Documented that the patient slipped due to rain on campus and fell on concrete, resulting in minor abrasions.  - Patient attempted to brace the fall on the wet surface.  -moves all extremities without any difficulty, no swelling, redness or drainage noted, denies pain/discomfort, mild abrasion to left knee.     Abrasion of skin:  - Examined abrasions on the patient's left knee from the fall and confirmed no other injuries were present.  - Instructed the patient on proper wound care using provided supplies: clean scrapes with saline solution and apply neosporin antibiotic ointment.      FOLLOW-UP:  - Provided work release note for patient to return to work on Monday, June 23, 2025 with no restrictions.      Medication List with Changes/Refills   Current Medications    ABILIFY 5 MG TAB    Take 1 tablet (5 mg total) by mouth every evening.    AMITRIPTYLINE (ELAVIL) 50 MG TABLET    Take 1 tablet (50 mg total) by mouth every evening.    CYCLOBENZAPRINE (FLEXERIL) 10 MG TABLET    Take 1 tablet (10 mg total) by mouth 3 (three) times daily as needed for Muscle spasms.    DEXTROAMPHETAMINE-AMPHETAMINE (ADDERALL XR) 10 MG 24 HR CAPSULE    1 capsule in the morning Orally Once a day for 30 days    KORY FE 1.5/30, 28, 1.5 MG-30 MCG (21)/75 MG (7) TABLET    Take 1 tablet by mouth once daily.    NICOTINE (NICODERM CQ) 21 MG/24 HR    Place 1 patch onto the skin once daily.    PANTOPRAZOLE (PROTONIX) 40 MG TABLET    Take 1 tablet (40 mg total) by mouth once daily.    PRISTIQ 100 MG TB24    Take 1 tablet (100 mg total)  by mouth once daily.    UREA 20 % CREA    APPLY TOPICALLY ONCE DAILY TO DRY SKIN ON FEET    VARENICLINE TARTRATE (CHANTIX STARTING MONTH BOX) 0.5 MG (11)- 1 MG (42) TABLET    Take one 0.5mg tab by mouth once daily X3 days,then increase to one 0.5mg tab twice daily X4 days,then increase to one 1mg tab twice daily   Discontinued Medications    ALAHIST D 10-17.5 MG TAB    Take 1 tablet by mouth every 4 (four) hours as needed.    BUTALBITAL-ACETAMINOPHEN-CAFF -40 MG CAP    Take by mouth.    NICOTINE POLACRILEX 2 MG LOZG    Take 1 lozenge (2 mg total) by mouth as needed (in place of the cigarettes 6-8 times a day). No Cinnamon flavor        Follow up if symptoms worsen or fail to improve.    I spent a total of 30 minutes on the day of the visit.This includes face to face time and non-face to face time preparing to see the patient (eg, review of tests), obtaining and/or reviewing separately obtained history, documenting clinical information in the electronic or other health record, independently interpreting results and communicating results to the patient/family/caregiver, or care coordinator.   KO Rudd, MSN, FNP-C     This note was generated with the assistance of ambient listening technology. Verbal consent was obtained by the patient and accompanying visitor(s) for the recording of patient appointment to facilitate this note. I attest to having reviewed and edited the generated note for accuracy, though some syntax or spelling errors may persist. Please contact the author of this note for any clarification.            [1]   Social History  Tobacco Use   Smoking Status Every Day    Current packs/day: 1.00    Average packs/day: 1 pack/day for 6.5 years (6.5 ttl pk-yrs)    Types: Cigarettes    Start date: 1/1/2019   Smokeless Tobacco Never

## 2025-07-08 ENCOUNTER — TELEPHONE (OUTPATIENT)
Dept: SMOKING CESSATION | Facility: CLINIC | Age: 30
End: 2025-07-08
Payer: MEDICAID

## 2025-07-11 ENCOUNTER — OFFICE VISIT (OUTPATIENT)
Dept: PRIMARY CARE CLINIC | Facility: CLINIC | Age: 30
End: 2025-07-11
Payer: MEDICAID

## 2025-07-11 VITALS
HEIGHT: 65 IN | DIASTOLIC BLOOD PRESSURE: 78 MMHG | BODY MASS INDEX: 48.82 KG/M2 | OXYGEN SATURATION: 99 % | WEIGHT: 293 LBS | SYSTOLIC BLOOD PRESSURE: 113 MMHG | HEART RATE: 105 BPM

## 2025-07-11 DIAGNOSIS — F17.200 NICOTINE USE DISORDER: ICD-10-CM

## 2025-07-11 DIAGNOSIS — E66.813 CLASS 3 SEVERE OBESITY DUE TO EXCESS CALORIES WITH SERIOUS COMORBIDITY AND BODY MASS INDEX (BMI) OF 50.0 TO 59.9 IN ADULT: Primary | ICD-10-CM

## 2025-07-11 PROCEDURE — G2211 COMPLEX E/M VISIT ADD ON: HCPCS | Mod: S$PBB,,,

## 2025-07-11 PROCEDURE — 1159F MED LIST DOCD IN RCRD: CPT | Mod: CPTII,,,

## 2025-07-11 PROCEDURE — 1160F RVW MEDS BY RX/DR IN RCRD: CPT | Mod: CPTII,,,

## 2025-07-11 PROCEDURE — 3008F BODY MASS INDEX DOCD: CPT | Mod: CPTII,,,

## 2025-07-11 PROCEDURE — 99214 OFFICE O/P EST MOD 30 MIN: CPT | Mod: S$PBB,25,,

## 2025-07-11 PROCEDURE — 99999 PR PBB SHADOW E&M-EST. PATIENT-LVL III: CPT | Mod: PBBFAC,,,

## 2025-07-11 PROCEDURE — 3078F DIAST BP <80 MM HG: CPT | Mod: CPTII,,,

## 2025-07-11 PROCEDURE — 3044F HG A1C LEVEL LT 7.0%: CPT | Mod: CPTII,,,

## 2025-07-11 PROCEDURE — 99213 OFFICE O/P EST LOW 20 MIN: CPT | Mod: PBBFAC,PN,25

## 2025-07-11 PROCEDURE — 99406 BEHAV CHNG SMOKING 3-10 MIN: CPT | Mod: S$PBB,,,

## 2025-07-11 PROCEDURE — 3074F SYST BP LT 130 MM HG: CPT | Mod: CPTII,,,

## 2025-07-11 PROCEDURE — G0447 BEHAVIOR COUNSEL OBESITY 15M: HCPCS | Mod: PBBFAC,PN

## 2025-07-11 PROCEDURE — G0447 BEHAVIOR COUNSEL OBESITY 15M: HCPCS | Mod: S$PBB,,,

## 2025-07-13 NOTE — PROGRESS NOTES
"Subjective:       Patient ID: Saul Huff is a 29 y.o. female.    Chief Complaint: Pre-op Exam    HPI    Saul Huff is a 29 y.o. year old female  who comes to clinic for obesity and concerns regarding weight loss surgery    History of Present Illness    CHIEF COMPLAINT:  Patient presents today to discuss bariatric surgery preparation and clearance.    Body mass index is 60.64 kg/m².  Patient recently had appointment with bariatric surgery. That was her 1st appointment.  Patient is planning to undergo bariatric surgery, she states surgery is supposed to be at the end a year, in approximately 5-6 months. Patient came to clinic with concerns regarding obesity and surgery. Discussed with patient preop evaluation, that this should be done couple of weeks to a month before her surgery. Discussed with patient if we do preop evaluation today, labs we will have been too old to be used appropriately if her surgery is in 6 months. Patient voiced understanding.    She reports severe body pain impacting daily activities and mobility. She also complains of fatigue, this has been discussed in the previous appointments.    GERD:  She reports ongoing acid reflux, currently managed with Pantoprazole. Symptoms are exacerbated by sleeping position and excess weight, causing discomfort with swallowing.    SOCIAL HISTORY:  She currently smokes approximately one pack in 2-3 days, varying with stress levels, reduced from one pack per day after attending smoking cessation class. She consumes 4-5 sodas daily and frequently snacks on candy. She exhibits irregular eating patterns when stressed, characterized by periods of not eating followed by overeating.    CURRENT MEDICATIONS:  She takes Pantoprazole for acid reflux management, probiotic, and vitamin B12 supplements.    ROS negative except as above  Objective:      /78 (BP Location: Left arm, Patient Position: Sitting)   Pulse 105   Ht 5' 5" (1.651 m)   Wt (!) 165.3 kg " (364 lb 6.4 oz)   LMP 06/17/2025 (Exact Date)   SpO2 99%   BMI 60.64 kg/m²    Physical Exam  Vitals reviewed.   Constitutional:       Appearance: Normal appearance. She is obese.   HENT:      Head: Normocephalic.      Mouth/Throat:      Mouth: Mucous membranes are moist.   Cardiovascular:      Rate and Rhythm: Normal rate and regular rhythm.      Pulses: Normal pulses.      Heart sounds: Normal heart sounds.   Pulmonary:      Effort: Pulmonary effort is normal.      Breath sounds: Normal breath sounds. No wheezing or rhonchi.   Abdominal:      General: Abdomen is flat. There is no distension.   Musculoskeletal:         General: No swelling. Normal range of motion.      Cervical back: Normal range of motion.   Skin:     General: Skin is warm.      Coloration: Skin is not jaundiced.   Neurological:      Mental Status: She is alert.         Assessment:       1. Class 3 severe obesity due to excess calories with serious comorbidity and body mass index (BMI) of 50.0 to 59.9 in adult    2. Nicotine use disorder          Plan:       1. Class 3 severe obesity due to excess calories with serious comorbidity and body mass index (BMI) of 50.0 to 59.9 in adult (Primary)  Body mass index is 60.64 kg/m². , comorbidity includes fatigue, GERD  Discussed the following:  Goal of 150 minutes aerobic exercise weekly. Recommend at least 30 mins, 3 days weekly and work up to 5 days.  Target heart rate while performing aerobic activity.   Monitoring caloric intake  Organizing plate with half vegetables, quarter whole grain starches or starchy vegetables, and quarter lean protein.   Goal of at least 1 vegetarian meal weekly and emphasizing fresh vegetables and fruits in diet.   Emphasize decreasing processed foods, soda, and eating out as these frequently have added unnecessary calories.  Time Spent: A total of 16 minutes was spent in face-to-face behavioral counseling focused on weight loss and obesity management    Discussed with  patient preop evaluation, that this should be done couple of weeks to a month before her surgery. Discussed with patient if we do preop evaluation today, labs we will have been too old to be used appropriately if her surgery is in 6 months. Patient voiced understanding.    2. Nicotine use disorder  Discussed tobacco use with patient. Reviewed deleterious effects of smoking.    I recommended tobacco cessation. I offered referral to our tobacco cessation clinic.  This was declined. Patient was counseled regarding smoking for 5 minutes.      Follow up in about 3 months (around 10/11/2025) for wt loss f/u.      Jovan Galeano M.D.    This note was generated with the assistance of ambient listening technology. Verbal consent was obtained by the patient and accompanying visitor(s) for the recording of patient appointment to facilitate this note. I attest to having reviewed and edited the generated note for accuracy, though some syntax or spelling errors may persist. Please contact the author of this note for any clarification.    I spent a total of 30 minutes on the day of the visit.This includes face to face time and non-face to face time preparing to see the patient (eg, review of tests), obtaining and/or reviewing separately obtained history, documenting clinical information in the electronic or other health record, independently interpreting results and communicating results to the patient/family/caregiver, or care coordinator.

## 2025-07-15 ENCOUNTER — OFFICE VISIT (OUTPATIENT)
Dept: PRIMARY CARE CLINIC | Facility: CLINIC | Age: 30
End: 2025-07-15
Payer: MEDICAID

## 2025-07-15 DIAGNOSIS — R09.89 SYMPTOMS OF UPPER RESPIRATORY INFECTION (URI): ICD-10-CM

## 2025-07-15 DIAGNOSIS — R05.9 COUGH IN ADULT: ICD-10-CM

## 2025-07-15 DIAGNOSIS — U07.1 COVID-19: Primary | ICD-10-CM

## 2025-07-15 LAB
CTP QC/QA: YES
CTP QC/QA: YES
POC MOLECULAR INFLUENZA A AGN: NEGATIVE
POC MOLECULAR INFLUENZA B AGN: NEGATIVE
S PYO RRNA THROAT QL PROBE: NEGATIVE

## 2025-07-15 PROCEDURE — 98001 SYNCH AUDIO-VIDEO NEW LOW 30: CPT | Mod: 95,,, | Performed by: NURSE PRACTITIONER

## 2025-07-15 PROCEDURE — 87502 INFLUENZA DNA AMP PROBE: CPT | Mod: QW,,, | Performed by: NURSE PRACTITIONER

## 2025-07-15 PROCEDURE — 3044F HG A1C LEVEL LT 7.0%: CPT | Mod: CPTII,95,, | Performed by: NURSE PRACTITIONER

## 2025-07-15 PROCEDURE — 87880 STREP A ASSAY W/OPTIC: CPT | Mod: QW,,, | Performed by: NURSE PRACTITIONER

## 2025-07-15 RX ORDER — BENZONATATE 100 MG/1
100 CAPSULE ORAL 3 TIMES DAILY PRN
Qty: 20 CAPSULE | Refills: 0 | Status: SHIPPED | OUTPATIENT
Start: 2025-07-15 | End: 2025-07-25

## 2025-07-15 RX ORDER — ALBUTEROL SULFATE 90 UG/1
2 INHALANT RESPIRATORY (INHALATION) EVERY 6 HOURS PRN
Qty: 18 G | Refills: 0 | Status: SHIPPED | OUTPATIENT
Start: 2025-07-15 | End: 2026-07-15

## 2025-07-15 RX ORDER — PROMETHAZINE HYDROCHLORIDE AND DEXTROMETHORPHAN HYDROBROMIDE 6.25; 15 MG/5ML; MG/5ML
5 SYRUP ORAL NIGHTLY PRN
Qty: 118 ML | Refills: 0 | Status: SHIPPED | OUTPATIENT
Start: 2025-07-15

## 2025-07-15 RX ORDER — AZITHROMYCIN 1 G/1
1 POWDER, FOR SUSPENSION ORAL ONCE
Qty: 1 PACKET | Refills: 0 | Status: SHIPPED | OUTPATIENT
Start: 2025-07-15 | End: 2025-07-15

## 2025-07-15 RX ORDER — AZELASTINE 1 MG/ML
1 SPRAY, METERED NASAL 2 TIMES DAILY
Qty: 30 ML | Refills: 0 | Status: SHIPPED | OUTPATIENT
Start: 2025-07-15 | End: 2025-07-20

## 2025-07-15 RX ORDER — LEVOCETIRIZINE DIHYDROCHLORIDE 5 MG/1
5 TABLET, FILM COATED ORAL NIGHTLY
Qty: 30 TABLET | Refills: 11 | Status: SHIPPED | OUTPATIENT
Start: 2025-07-15 | End: 2026-07-15

## 2025-07-15 NOTE — LETTER
July 15, 2025      [facility]  [address] [place] [id number]       Patient: Saul Huff   YOB: 1995  Date of Visit: 07/15/2025    To Whom It May Concern:    Shin Huff  was at [facility] on 07/15/2025. The patient may return to work 07/21/2025  with no restrictions. If you have any questions or concerns, or if I can be of further assistance, please do not hesitate to contact me.    Sincerely,       Elizabeth Colbert NP

## 2025-07-15 NOTE — PROGRESS NOTES
The patient location is: [place]    The chief complaint leading to consultation is: URI symptoms    Visit type: audiovisual    Face to Face time with patient: 15  30 minutes of total time spent on the encounter, which includes face to face time and non-face to face time preparing to see the patient (eg, review of tests), Obtaining and/or reviewing separately obtained history, Documenting clinical information in the electronic or other health record, Independently interpreting results (not separately reported) and communicating results to the patient/family/caregiver, or Care coordination (not separately reported).         Each patient to whom he or she provides medical services by telemedicine is:  (1) informed of the relationship between the physician and patient and the respective role of any other health care provider with respect to management of the patient; and (2) notified that he or she may decline to receive medical services by telemedicine and may withdraw from such care at any time.    Notes:      Subjective:       Patient ID: Saul Huff is a 29 y.o. female.    Chief Complaint: URI symptoms    History of Present Illness  CHIEF COMPLAINT:  Patient presents today with upper respiratory symptoms.    HISTORY OF PRESENT ILLNESS:  She reports nasal congestion, rhinorrhea, sneezing, and sore throat that developed today. She experiences facial pain and pressure consistent with suspected sinus infection. She has a productive cough with green, thick sputum and endorses shortness of breath. She reports fever, headache, and possible body aches, though she is uncertain whether body aches are related to her menstrual period or current illness. These symptoms are causing significant discomfort and impacting her respiratory function.    GASTROINTESTINAL:  She reports poor appetite with minimal oral intake. When attempting to eat, she experiences immediate post-prandial nausea and vomiting. She states that food consumed  quickly returns after eating.    MEDICAL HISTORY:  She has a history of using an inhaler, but currently does not have an active prescription.       Past Medical History:   Diagnosis Date    Allergy     Anxiety     Fibromyalgia     Recurrent upper respiratory infection (URI)         Past Surgical History:   Procedure Laterality Date    SINUS SURGERY          Family History   Problem Relation Name Age of Onset    Hypertension Mother          sleep apnea    Diabetes Father          schizophrenia, bipolar, hypertension, chf    Lupus Sister          MS    Uterine cancer Maternal Grandmother      Alcohol abuse Maternal Grandfather      Ovarian cancer Paternal Grandmother      Schizophrenia Paternal Grandfather      Breast cancer Neg Hx      Colon cancer Neg Hx      Allergic rhinitis Neg Hx      Allergies Neg Hx      Angioedema Neg Hx      Asthma Neg Hx      Atopy Neg Hx      Eczema Neg Hx      Immunodeficiency Neg Hx      Rhinitis Neg Hx      Urticaria Neg Hx         Tobacco Use History[1]    Social History     Social History Narrative    Not on file       Review of patient's allergies indicates:   Allergen Reactions    Iodinated contrast media Nausea And Vomiting        Review of Systems   HENT:  Positive for congestion, postnasal drip, rhinorrhea, sinus pressure, sinus pain and sore throat.    Respiratory:  Positive for cough and shortness of breath. Negative for chest tightness.    Cardiovascular:  Negative for chest pain and palpitations.   Gastrointestinal:  Positive for nausea and vomiting.   Neurological:  Positive for headaches. Negative for dizziness and light-headedness.         Objective:        There were no vitals filed for this visit.     Limited physical examination due to nature of virtual/telemedicine visit.    Physical Exam  Constitutional:       Appearance: She is toxic-appearing.   Pulmonary:      Effort: No respiratory distress.   Neurological:      Mental Status: She is oriented to person, place, and  time.         Assessment:       1. COVID-19    2. Symptoms of upper respiratory infection (URI)    3. Cough in adult        Plan:       COVID-19  -     azithromycin (ZITHROMAX) 1 gram Pack; Take 1 g by mouth once. for 1 dose  Dispense: 1 packet; Refill: 0  -     levocetirizine (XYZAL) 5 MG tablet; Take 1 tablet (5 mg total) by mouth every evening.  Dispense: 30 tablet; Refill: 11  -     azelastine (ASTELIN) 137 mcg (0.1 %) nasal spray; 1 spray (137 mcg total) by Nasal route 2 (two) times daily. for 5 days  Dispense: 30 mL; Refill: 0  -     albuterol (VENTOLIN HFA) 90 mcg/actuation inhaler; Inhale 2 puffs into the lungs every 6 (six) hours as needed for Wheezing or Shortness of Breath. Rescue  Dispense: 18 g; Refill: 0    Symptoms of upper respiratory infection (URI)  -     POCT rapid strep A-NEGATIVE  -     POCT COVID-19 Rapid Screening-POSITIVE; Future; Expected date: 07/15/2025  -     POCT Influenza A/B Molecular-NEGATIVE    Cough in adult  -     promethazine-dextromethorphan (PROMETHAZINE-DM) 6.25-15 mg/5 mL Syrp; Take 5 mLs by mouth nightly as needed (cough, nausea, vomiting).  Dispense: 118 mL; Refill: 0  -     benzonatate (TESSALON) 100 MG capsule; Take 1 capsule (100 mg total) by mouth 3 (three) times daily as needed for Cough.  Dispense: 20 capsule; Refill: 0    The visit began as an in-person visit; the patient checked in, returned to her car, stated she was too weak, swabbed in the car, and the visit was converted to virtual and completed.     FOLLOW-UP:  - Follow up in 5 days.      Medication List with Changes/Refills   New Medications    ALBUTEROL (VENTOLIN HFA) 90 MCG/ACTUATION INHALER    Inhale 2 puffs into the lungs every 6 (six) hours as needed for Wheezing or Shortness of Breath. Rescue    AZITHROMYCIN (Z-BENJA) 250 MG TABLET    Take 2 tablets by mouth on day 1; Take 1 tablet by mouth on days 2-5    BENZONATATE (TESSALON) 100 MG CAPSULE    Take 1 capsule (100 mg total) by mouth 3 (three) times daily  as needed for Cough.    LEVOCETIRIZINE (XYZAL) 5 MG TABLET    Take 1 tablet (5 mg total) by mouth every evening.    PROMETHAZINE-DEXTROMETHORPHAN (PROMETHAZINE-DM) 6.25-15 MG/5 ML SYRP    Take 5 mLs by mouth nightly as needed (cough, nausea, vomitting).   Current Medications    ABILIFY 5 MG TAB    Take 1 tablet (5 mg total) by mouth every evening.    AMITRIPTYLINE (ELAVIL) 50 MG TABLET    Take 1 tablet (50 mg total) by mouth every evening.    CYCLOBENZAPRINE (FLEXERIL) 10 MG TABLET    Take 1 tablet (10 mg total) by mouth 3 (three) times daily as needed for Muscle spasms.    DEXTROAMPHETAMINE-AMPHETAMINE (ADDERALL XR) 10 MG 24 HR CAPSULE    1 capsule in the morning Orally Once a day for 30 days    KORY FE 1.5/30, 28, 1.5 MG-30 MCG (21)/75 MG (7) TABLET    Take 1 tablet by mouth once daily.    NICOTINE (NICODERM CQ) 21 MG/24 HR    Place 1 patch onto the skin once daily.    PANTOPRAZOLE (PROTONIX) 40 MG TABLET    Take 1 tablet (40 mg total) by mouth once daily.    PRISTIQ 100 MG TB24    Take 1 tablet (100 mg total) by mouth once daily.    UREA 20 % CREA    APPLY TOPICALLY ONCE DAILY TO DRY SKIN ON FEET    VARENICLINE TARTRATE (CHANTIX STARTING MONTH BOX) 0.5 MG (11)- 1 MG (42) TABLET    Take one 0.5mg tab by mouth once daily X3 days,then increase to one 0.5mg tab twice daily X4 days,then increase to one 1mg tab twice daily        Follow up if symptoms worsen or fail to improve.      KO Rudd, MSN, FNP-C     This note was generated with the assistance of ambient listening technology. Verbal consent was obtained by the patient and accompanying visitor(s) for the recording of patient appointment to facilitate this note. I attest to having reviewed and edited the generated note for accuracy, though some syntax or spelling errors may persist. Please contact the author of this note for any clarification.                 [1]   Social History  Tobacco Use   Smoking Status Every Day    Current packs/day: 1.00     Average packs/day: 1 pack/day for 6.6 years (6.6 ttl pk-yrs)    Types: Cigarettes    Start date: 1/1/2019   Smokeless Tobacco Never

## 2025-07-17 ENCOUNTER — TELEPHONE (OUTPATIENT)
Dept: PRIMARY CARE CLINIC | Facility: CLINIC | Age: 30
End: 2025-07-17
Payer: MEDICAID

## 2025-07-17 ENCOUNTER — PATIENT MESSAGE (OUTPATIENT)
Dept: PRIMARY CARE CLINIC | Facility: CLINIC | Age: 30
End: 2025-07-17
Payer: MEDICAID

## 2025-07-17 NOTE — TELEPHONE ENCOUNTER
Primary Information    Source   Saul Huff (Patient)    Subject   Saul Huff (Patient)    Topic   Medications - Pharmacy      Summary   Pharmacy   Communication   Pharmacy is calling to clarify an RX.            RX name:  Azithromycin medication that was prescribed            What do they need to clarify:  out of the powder and need to change to tablets            Pharmacy Contact Name and phone number:      [facility]/pharmacy #[id number] [facility] [address] [address]      Phone: [phone] Fax: [phone]

## 2025-07-18 DIAGNOSIS — U07.1 COVID-19: Primary | ICD-10-CM

## 2025-07-18 RX ORDER — AZITHROMYCIN 250 MG/1
TABLET, FILM COATED ORAL
Qty: 6 TABLET | Refills: 0 | Status: SHIPPED | OUTPATIENT
Start: 2025-07-18 | End: 2025-07-23

## 2025-07-30 ENCOUNTER — TELEPHONE (OUTPATIENT)
Dept: SMOKING CESSATION | Facility: CLINIC | Age: 30
End: 2025-07-30
Payer: MEDICAID

## 2025-08-12 DIAGNOSIS — U07.1 COVID-19: ICD-10-CM

## 2025-08-12 RX ORDER — AZELASTINE 1 MG/ML
1 SPRAY, METERED NASAL 2 TIMES DAILY
Qty: 30 ML | Refills: 1 | Status: SHIPPED | OUTPATIENT
Start: 2025-08-12 | End: 2025-08-17

## 2025-09-01 DIAGNOSIS — K21.9 GASTROESOPHAGEAL REFLUX DISEASE WITHOUT ESOPHAGITIS: ICD-10-CM

## 2025-09-02 RX ORDER — PANTOPRAZOLE SODIUM 40 MG/1
40 TABLET, DELAYED RELEASE ORAL DAILY
Qty: 30 TABLET | Refills: 0 | Status: SHIPPED | OUTPATIENT
Start: 2025-09-02